# Patient Record
Sex: MALE | Race: WHITE | Employment: OTHER | URBAN - METROPOLITAN AREA
[De-identification: names, ages, dates, MRNs, and addresses within clinical notes are randomized per-mention and may not be internally consistent; named-entity substitution may affect disease eponyms.]

---

## 2021-03-02 ENCOUNTER — TELEPHONE (OUTPATIENT)
Dept: SURGERY | Facility: CLINIC | Age: 66
End: 2021-03-02

## 2021-03-03 ENCOUNTER — OFFICE VISIT (OUTPATIENT)
Dept: SURGERY | Facility: CLINIC | Age: 66
End: 2021-03-03
Payer: COMMERCIAL

## 2021-03-03 VITALS
WEIGHT: 235.19 LBS | OXYGEN SATURATION: 98 % | TEMPERATURE: 98 F | DIASTOLIC BLOOD PRESSURE: 85 MMHG | SYSTOLIC BLOOD PRESSURE: 156 MMHG | RESPIRATION RATE: 16 BRPM | HEART RATE: 76 BPM

## 2021-03-03 DIAGNOSIS — C44.42 SQUAMOUS CELL CARCINOMA OF NECK: Primary | ICD-10-CM

## 2021-03-03 PROBLEM — I25.10 CORONARY ARTERY DISEASE: Status: ACTIVE | Noted: 2021-03-03

## 2021-03-03 PROBLEM — I10 HYPERTENSION: Status: ACTIVE | Noted: 2021-03-03

## 2021-03-03 PROBLEM — E11.9 TYPE II DIABETES MELLITUS (HCC): Status: ACTIVE | Noted: 2021-03-03

## 2021-03-03 PROCEDURE — 3079F DIAST BP 80-89 MM HG: CPT | Performed by: SURGERY

## 2021-03-03 PROCEDURE — 99204 OFFICE O/P NEW MOD 45 MIN: CPT | Performed by: SURGERY

## 2021-03-03 PROCEDURE — 99205 OFFICE O/P NEW HI 60 MIN: CPT | Performed by: SURGERY

## 2021-03-03 PROCEDURE — 3077F SYST BP >= 140 MM HG: CPT | Performed by: SURGERY

## 2021-03-03 NOTE — CONSULTS
New Patient Consultation    Chief Complaint:  Patient presents with:  Consult  SCCa R infraauricular area      History of Present Illness:   Luis Antonio Patterson is a 72year old male referred by Dr. 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  He noted this in t Use      Smoking status: Former Smoker      Smokeless tobacco: Never Used    Substance and Sexual Activity      Alcohol use: Yes        Review of Systems:    A 13 point review of systems was performed on the intake sheet.   The patient reports see HPI  Gene disorders, blood clots, or pulmonary embolism. Musculoskeletal:  The patient denies muscle aches/pain, joint pain, stiff joints, neck pain, back pain or bone pain.   Neurologic:  There is no history of migraines or severe headaches, seizure/epilepsy, sp We discussed skin grafts local flaps regional flaps and free flaps as potential reconstructive options. We discussed if facial nerve involvement he may need nerve grafts etc,     The different treatment options were discussed with the patient.   The procedu

## 2021-03-03 NOTE — CONSULTS
[facility] [facility] Surgical Oncology        Patient Name:  Adin Shay   YOB: 1955   Gender:  Male   Appt Date:  3/3/2021   Provider:  Deena Pichardo MD     PATIENT PROVIDERS  Referring Provider: Rick Matias MD  Primary Care Provider:Home •  mupirocin 2 % External Ointment, Apply to open wound twice daily, Disp: 22 g, Rfl: 2  •  SITagliptin-metFORMIN HCl ER  MG Oral Tablet 24 Hr, Take 2 tablets by mouth daily. , Disp: , Rfl:   •  tadalafil 5 MG Oral Tab, Take 5 mg by mouth daily as ne Skin: The scalp and remainder of the skin do not show any suspicious lesions. The wide excision site is well healed.  There is a 6.5 cm x 2.5 cm ulcerated fungating lesion of the right neck extending somewhat anteriorly into the face and wrapping around the

## 2021-03-08 ENCOUNTER — LAB ENCOUNTER (OUTPATIENT)
Dept: LAB | Facility: HOSPITAL | Age: 66
End: 2021-03-08
Attending: SURGERY
Payer: MEDICARE

## 2021-03-08 DIAGNOSIS — C44.42 SQUAMOUS CELL CARCINOMA OF NECK: Primary | ICD-10-CM

## 2021-03-08 DIAGNOSIS — C44.92 SCC (SQUAMOUS CELL CARCINOMA): Primary | ICD-10-CM

## 2021-03-08 PROCEDURE — 88321 CONSLTJ&REPRT SLD PREP ELSWR: CPT

## 2021-03-14 ENCOUNTER — HOSPITAL ENCOUNTER (OUTPATIENT)
Dept: CT IMAGING | Age: 66
Discharge: HOME | End: 2021-03-14
Attending: PHYSICIAN ASSISTANT
Payer: MEDICARE

## 2021-03-14 DIAGNOSIS — C44.42 SQUAMOUS CELL CARCINOMA OF NECK: ICD-10-CM

## 2021-03-14 LAB — CREAT BLD-MCNC: 1.1 MG/DL

## 2021-03-14 PROCEDURE — 70491 CT SOFT TISSUE NECK W/DYE: CPT | Performed by: PHYSICIAN ASSISTANT

## 2021-03-14 PROCEDURE — 82565 ASSAY OF CREATININE: CPT

## 2021-03-16 ENCOUNTER — TELEPHONE (OUTPATIENT)
Dept: SURGERY | Facility: CLINIC | Age: 66
End: 2021-03-16

## 2021-03-16 DIAGNOSIS — C44.42 SQUAMOUS CELL CARCINOMA OF NECK: Primary | ICD-10-CM

## 2021-03-16 NOTE — TELEPHONE ENCOUNTER
CT scan performed and reviewed. I called patient and discussed the results with him.  -Plan to further discuss at tomorrow's tumor board. -PET/CT scan ordered. Patient to schedule.

## 2021-03-22 ENCOUNTER — HOSPITAL ENCOUNTER (OUTPATIENT)
Dept: NUCLEAR MEDICINE | Facility: HOSPITAL | Age: 66
Discharge: HOME | End: 2021-03-22
Attending: SURGERY
Payer: MEDICARE

## 2021-03-22 DIAGNOSIS — C44.42 SQUAMOUS CELL CARCINOMA OF NECK: ICD-10-CM

## 2021-03-22 LAB — GLUCOSE BLD-MCNC: 138 MG/DL (ref 70–99)

## 2021-03-22 PROCEDURE — 82962 GLUCOSE BLOOD TEST: CPT

## 2021-03-22 PROCEDURE — 78815 PET IMAGE W/CT SKULL-THIGH: CPT | Performed by: SURGERY

## 2021-03-24 ENCOUNTER — OFFICE VISIT (OUTPATIENT)
Dept: SURGERY | Facility: CLINIC | Age: 66
End: 2021-03-24
Payer: COMMERCIAL

## 2021-03-24 VITALS
OXYGEN SATURATION: 98 % | DIASTOLIC BLOOD PRESSURE: 83 MMHG | SYSTOLIC BLOOD PRESSURE: 159 MMHG | WEIGHT: 233.38 LBS | HEART RATE: 74 BPM | TEMPERATURE: 97 F | RESPIRATION RATE: 16 BRPM

## 2021-03-24 DIAGNOSIS — Z01.818 PRE-OP TESTING: ICD-10-CM

## 2021-03-24 DIAGNOSIS — C44.42 SQUAMOUS CELL CARCINOMA OF NECK: Primary | ICD-10-CM

## 2021-03-24 PROCEDURE — 3079F DIAST BP 80-89 MM HG: CPT | Performed by: SURGERY

## 2021-03-24 PROCEDURE — 3077F SYST BP >= 140 MM HG: CPT | Performed by: SURGERY

## 2021-03-24 PROCEDURE — 99213 OFFICE O/P EST LOW 20 MIN: CPT | Performed by: SURGERY

## 2021-03-24 PROCEDURE — 99214 OFFICE O/P EST MOD 30 MIN: CPT | Performed by: SURGERY

## 2021-03-24 NOTE — H&P
[facility] [facility] Surgical Oncology        Patient Name:  Arnulfo Beasley   YOB: 1955   Gender:  Male   Appt Date:  3/24/2021   Provider:  Maine Quiñonez MD     PATIENT PROVIDERS    [facility], MD   Address: [address] [place] [address] 1 Social History:  Social History    Tobacco Use      Smoking status: Former Smoker      Smokeless tobacco: Never Used    Alcohol use: Yes    Drug use: Not on file     Reviewed:  Family History   Problem Relation Age of Onset   • Other (lung cancer) Mother Dr. Yanira Perla from plastic surgery for consideration of reconstruction. I would like also to involve my esteemed otolaryngology colleague, Dr. Treasure Dukes, and surgical care. Patient agreed and understood. He had ample time to ask questions.   Arrangements will

## 2021-03-24 NOTE — PATIENT INSTRUCTIONS
Surgery: Resection neck/face squamous cell carcinoma partial resection ear, right superficial parotidectomy, possible lymph node dissection reconstruction with Dr. Nathan Maldonado    Date of Surgery: [facility]    Hospital:    [address], [facility] need to contact the prescribing provider for specific instructions on holding these medications for your procedure. · Inform your primary care physician of your surgery and ask if him/her will need to see you prior to surgery.   · If you develop symptoms o

## 2021-03-24 NOTE — CONSULTS
Estabilshed Patient Consultation    Chief Complaint: SCCA R neck infraauricular area    History of Present Illness:   Suze Rivera is a 72year old male referred by Dr. 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  He noted this in the spring of last year adherent to the underlying area     Integument/Skin:see above      Respiratory: Normal respiratory effort.      Cardiovascular: no cyanosis, no edema     Lymphatic:  There is no cervical, supraclavicular,  lymphadenopathy appreciated.     Musculoskeletal: counseling the patient and coordinating care.     Patient understands and wishes to proceed with the procedure, questions were answered.        Rea Head MD  3/24/2021  5:11 PM

## 2021-03-24 NOTE — PATIENT INSTRUCTIONS
Surgeon:              Dr. Preeti Solano.  Radha Clark, PhD                                          Tel:         [phone]                                  Fax:        [phone]    Surgery/Procedure:       Right cheek/infraauricular, ear reconstruction with local symptoms, test positive or have been exposed for Covid- 19 prior to surgery. If Covid-19 positive history, patient was not hospitalized; symptoms included sinus symptoms and resolved completely.      Consent obtained for skin cancer reconstruction  Photos

## 2021-03-25 ENCOUNTER — TELEPHONE (OUTPATIENT)
Dept: SURGERY | Facility: CLINIC | Age: 66
End: 2021-03-25

## 2021-03-26 ENCOUNTER — TELEPHONE (OUTPATIENT)
Dept: SURGERY | Facility: CLINIC | Age: 66
End: 2021-03-26

## 2021-03-26 DIAGNOSIS — C44.42 SQUAMOUS CELL CARCINOMA OF NECK: Primary | ICD-10-CM

## 2021-03-26 NOTE — TELEPHONE ENCOUNTER
Patient called and I scheduled his surgery with Dr Kavya Quigley, Dr Christian Santiago and Dr Carlos Polk on 04/27/2021 at [facility]. Nusrat Hogue from Dr Aldo Castañeda office called me to day to confirm 04/27/21. Confirmed date and location with the patient.  He stated he had his A1C done 2 w

## 2021-04-02 RX ORDER — ACETAMINOPHEN 500 MG
1000 TABLET ORAL ONCE
Status: CANCELLED | OUTPATIENT
Start: 2021-04-02 | End: 2021-04-02

## 2021-04-21 ENCOUNTER — TELEPHONE (OUTPATIENT)
Dept: SURGERY | Facility: CLINIC | Age: 66
End: 2021-04-21

## 2021-04-21 NOTE — TELEPHONE ENCOUNTER
Spoke to patient regarding his clearance for his upcoming surgery. Per Dr. Hayder Greenwood staff, only cardiac clearance is only needed as he doesn't see his PCP.  I will call cardiologist to have the rest of the clearance (CMP and A1C sent over)  Pt was appreciati

## 2021-04-23 ENCOUNTER — TELEPHONE (OUTPATIENT)
Dept: SURGERY | Facility: CLINIC | Age: 66
End: 2021-04-23

## 2021-04-23 NOTE — TELEPHONE ENCOUNTER
Called patient to inform him were missing his CMP for his pre-op medical clearance. He is going to the [facility] tomorrow for his COVID test and will get it done then. He was appreciative of the call.

## 2021-04-23 NOTE — TELEPHONE ENCOUNTER
CMP was found in care everywhere, pt notified that he does not need to repeat the lab. He is cleared for surgery.

## 2021-04-24 ENCOUNTER — LAB ENCOUNTER (OUTPATIENT)
Dept: LAB | Age: 66
DRG: 144 | End: 2021-04-24
Attending: SURGERY
Payer: MEDICARE

## 2021-04-24 DIAGNOSIS — C44.42 SQUAMOUS CELL CARCINOMA OF NECK: ICD-10-CM

## 2021-04-26 ENCOUNTER — ANESTHESIA EVENT (OUTPATIENT)
Dept: SURGERY | Facility: HOSPITAL | Age: 66
DRG: 144 | End: 2021-04-26
Payer: MEDICARE

## 2021-04-26 ENCOUNTER — TELEPHONE (OUTPATIENT)
Dept: SURGERY | Facility: CLINIC | Age: 66
End: 2021-04-26

## 2021-04-26 NOTE — TELEPHONE ENCOUNTER
Sent medical clearance for stat scanning, CBC is scanned into media and CMP is under care everywhere.

## 2021-04-27 ENCOUNTER — ANESTHESIA (OUTPATIENT)
Dept: SURGERY | Facility: HOSPITAL | Age: 66
DRG: 144 | End: 2021-04-27
Payer: MEDICARE

## 2021-04-27 ENCOUNTER — HOSPITAL ENCOUNTER (INPATIENT)
Facility: HOSPITAL | Age: 66
LOS: 1 days | Discharge: HOME | DRG: 144 | End: 2021-04-28
Attending: SURGERY | Admitting: SURGERY
Payer: MEDICARE

## 2021-04-27 DIAGNOSIS — C44.42 SQUAMOUS CELL CARCINOMA OF NECK: Primary | ICD-10-CM

## 2021-04-27 PROCEDURE — 99223 1ST HOSP IP/OBS HIGH 75: CPT | Performed by: HOSPITALIST

## 2021-04-27 PROCEDURE — 0HB4XZZ EXCISION OF NECK SKIN, EXTERNAL APPROACH: ICD-10-PCS | Performed by: SURGERY

## 2021-04-27 PROCEDURE — 3078F DIAST BP <80 MM HG: CPT | Performed by: HOSPITALIST

## 2021-04-27 PROCEDURE — 00BM0ZZ EXCISION OF FACIAL NERVE, OPEN APPROACH: ICD-10-PCS | Performed by: SURGERY

## 2021-04-27 PROCEDURE — 76942 ECHO GUIDE FOR BIOPSY: CPT | Performed by: ANESTHESIOLOGY

## 2021-04-27 PROCEDURE — 3074F SYST BP LT 130 MM HG: CPT | Performed by: HOSPITALIST

## 2021-04-27 PROCEDURE — 00UM0KZ SUPPLEMENT FACIAL NERVE WITH NONAUTOLOGOUS TISSUE SUBSTITUTE, OPEN APPROACH: ICD-10-PCS | Performed by: SURGERY

## 2021-04-27 PROCEDURE — 0KB20ZZ EXCISION OF RIGHT NECK MUSCLE, OPEN APPROACH: ICD-10-PCS | Performed by: SURGERY

## 2021-04-27 PROCEDURE — 3008F BODY MASS INDEX DOCD: CPT | Performed by: HOSPITALIST

## 2021-04-27 PROCEDURE — 0HX2XZZ TRANSFER RIGHT EAR SKIN, EXTERNAL APPROACH: ICD-10-PCS | Performed by: SURGERY

## 2021-04-27 PROCEDURE — 0CB80ZZ EXCISION OF RIGHT PAROTID GLAND, OPEN APPROACH: ICD-10-PCS | Performed by: OTOLARYNGOLOGY

## 2021-04-27 PROCEDURE — 0HX4XZZ TRANSFER NECK SKIN, EXTERNAL APPROACH: ICD-10-PCS | Performed by: SURGERY

## 2021-04-27 PROCEDURE — 07B10ZX EXCISION OF RIGHT NECK LYMPHATIC, OPEN APPROACH, DIAGNOSTIC: ICD-10-PCS | Performed by: SURGERY

## 2021-04-27 PROCEDURE — 0HB2XZZ EXCISION OF RIGHT EAR SKIN, EXTERNAL APPROACH: ICD-10-PCS | Performed by: SURGERY

## 2021-04-27 PROCEDURE — 0JU437Z SUPPLEMENT OF RIGHT NECK SUBCUTANEOUS TISSUE AND FASCIA WITH AUTOLOGOUS TISSUE SUBSTITUTE, PERCUTANEOUS APPROACH: ICD-10-PCS | Performed by: SURGERY

## 2021-04-27 RX ORDER — MEPERIDINE HYDROCHLORIDE 25 MG/ML
12.5 INJECTION INTRAMUSCULAR; INTRAVENOUS; SUBCUTANEOUS AS NEEDED
Status: DISCONTINUED | OUTPATIENT
Start: 2021-04-27 | End: 2021-04-27 | Stop reason: HOSPADM

## 2021-04-27 RX ORDER — HYDROCODONE BITARTRATE AND ACETAMINOPHEN 5; 325 MG/1; MG/1
2 TABLET ORAL AS NEEDED
Status: DISCONTINUED | OUTPATIENT
Start: 2021-04-27 | End: 2021-04-27 | Stop reason: HOSPADM

## 2021-04-27 RX ORDER — DEXTROSE MONOHYDRATE 25 G/50ML
50 INJECTION, SOLUTION INTRAVENOUS
Status: DISCONTINUED | OUTPATIENT
Start: 2021-04-27 | End: 2021-04-28

## 2021-04-27 RX ORDER — SODIUM CHLORIDE, SODIUM LACTATE, POTASSIUM CHLORIDE, CALCIUM CHLORIDE 600; 310; 30; 20 MG/100ML; MG/100ML; MG/100ML; MG/100ML
INJECTION, SOLUTION INTRAVENOUS CONTINUOUS
Status: DISCONTINUED | OUTPATIENT
Start: 2021-04-27 | End: 2021-04-27 | Stop reason: HOSPADM

## 2021-04-27 RX ORDER — HYDROCODONE BITARTRATE AND ACETAMINOPHEN 5; 325 MG/1; MG/1
2 TABLET ORAL EVERY 4 HOURS PRN
Status: DISCONTINUED | OUTPATIENT
Start: 2021-04-27 | End: 2021-04-28

## 2021-04-27 RX ORDER — DEXAMETHASONE SODIUM PHOSPHATE 4 MG/ML
VIAL (ML) INJECTION AS NEEDED
Status: DISCONTINUED | OUTPATIENT
Start: 2021-04-27 | End: 2021-04-27 | Stop reason: SURG

## 2021-04-27 RX ORDER — DOCUSATE SODIUM 100 MG/1
100 CAPSULE, LIQUID FILLED ORAL 2 TIMES DAILY
Qty: 14 CAPSULE | Refills: 0 | Status: SHIPPED | OUTPATIENT
Start: 2021-04-27 | End: 2021-05-13 | Stop reason: ALTCHOICE

## 2021-04-27 RX ORDER — HYDROMORPHONE HYDROCHLORIDE 1 MG/ML
0.4 INJECTION, SOLUTION INTRAMUSCULAR; INTRAVENOUS; SUBCUTANEOUS EVERY 5 MIN PRN
Status: DISCONTINUED | OUTPATIENT
Start: 2021-04-27 | End: 2021-04-27 | Stop reason: HOSPADM

## 2021-04-27 RX ORDER — DIPHENHYDRAMINE HYDROCHLORIDE 50 MG/ML
12.5 INJECTION INTRAMUSCULAR; INTRAVENOUS AS NEEDED
Status: DISCONTINUED | OUTPATIENT
Start: 2021-04-27 | End: 2021-04-27 | Stop reason: HOSPADM

## 2021-04-27 RX ORDER — LIDOCAINE HYDROCHLORIDE 10 MG/ML
INJECTION, SOLUTION EPIDURAL; INFILTRATION; INTRACAUDAL; PERINEURAL AS NEEDED
Status: DISCONTINUED | OUTPATIENT
Start: 2021-04-27 | End: 2021-04-27 | Stop reason: SURG

## 2021-04-27 RX ORDER — HYDROCODONE BITARTRATE AND ACETAMINOPHEN 5; 325 MG/1; MG/1
1 TABLET ORAL AS NEEDED
Status: DISCONTINUED | OUTPATIENT
Start: 2021-04-27 | End: 2021-04-27 | Stop reason: HOSPADM

## 2021-04-27 RX ORDER — NALOXONE HYDROCHLORIDE 0.4 MG/ML
80 INJECTION, SOLUTION INTRAMUSCULAR; INTRAVENOUS; SUBCUTANEOUS AS NEEDED
Status: DISCONTINUED | OUTPATIENT
Start: 2021-04-27 | End: 2021-04-27 | Stop reason: HOSPADM

## 2021-04-27 RX ORDER — HYDROCODONE BITARTRATE AND ACETAMINOPHEN 5; 325 MG/1; MG/1
1 TABLET ORAL EVERY 6 HOURS PRN
Qty: 20 TABLET | Refills: 0 | Status: SHIPPED | OUTPATIENT
Start: 2021-04-27 | End: 2021-05-13 | Stop reason: ALTCHOICE

## 2021-04-27 RX ORDER — SUFENTANIL CITRATE 50 UG/ML
INJECTION EPIDURAL; INTRAVENOUS AS NEEDED
Status: DISCONTINUED | OUTPATIENT
Start: 2021-04-27 | End: 2021-04-27 | Stop reason: SURG

## 2021-04-27 RX ORDER — LABETALOL HYDROCHLORIDE 5 MG/ML
5 INJECTION, SOLUTION INTRAVENOUS EVERY 5 MIN PRN
Status: DISCONTINUED | OUTPATIENT
Start: 2021-04-27 | End: 2021-04-27 | Stop reason: HOSPADM

## 2021-04-27 RX ORDER — DEXTROSE MONOHYDRATE 25 G/50ML
50 INJECTION, SOLUTION INTRAVENOUS
Status: DISCONTINUED | OUTPATIENT
Start: 2021-04-27 | End: 2021-04-27 | Stop reason: HOSPADM

## 2021-04-27 RX ORDER — HEPARIN SODIUM 5000 [USP'U]/ML
5000 INJECTION, SOLUTION INTRAVENOUS; SUBCUTANEOUS ONCE
Status: COMPLETED | OUTPATIENT
Start: 2021-04-27 | End: 2021-04-27

## 2021-04-27 RX ORDER — ONDANSETRON 2 MG/ML
4 INJECTION INTRAMUSCULAR; INTRAVENOUS AS NEEDED
Status: DISCONTINUED | OUTPATIENT
Start: 2021-04-27 | End: 2021-04-27 | Stop reason: HOSPADM

## 2021-04-27 RX ORDER — ONDANSETRON 2 MG/ML
4 INJECTION INTRAMUSCULAR; INTRAVENOUS EVERY 6 HOURS PRN
Status: DISCONTINUED | OUTPATIENT
Start: 2021-04-27 | End: 2021-04-28

## 2021-04-27 RX ORDER — ENOXAPARIN SODIUM 100 MG/ML
40 INJECTION SUBCUTANEOUS DAILY
Status: DISCONTINUED | OUTPATIENT
Start: 2021-04-28 | End: 2021-04-28

## 2021-04-27 RX ORDER — ONDANSETRON 2 MG/ML
INJECTION INTRAMUSCULAR; INTRAVENOUS AS NEEDED
Status: DISCONTINUED | OUTPATIENT
Start: 2021-04-27 | End: 2021-04-27 | Stop reason: SURG

## 2021-04-27 RX ORDER — DOCUSATE SODIUM 100 MG/1
100 CAPSULE, LIQUID FILLED ORAL 2 TIMES DAILY
Status: DISCONTINUED | OUTPATIENT
Start: 2021-04-27 | End: 2021-04-28

## 2021-04-27 RX ORDER — METOPROLOL SUCCINATE 25 MG/1
25 TABLET, EXTENDED RELEASE ORAL
Status: DISCONTINUED | OUTPATIENT
Start: 2021-04-27 | End: 2021-04-28

## 2021-04-27 RX ORDER — LIDOCAINE HYDROCHLORIDE AND EPINEPHRINE 10; 10 MG/ML; UG/ML
INJECTION, SOLUTION INFILTRATION; PERINEURAL AS NEEDED
Status: DISCONTINUED | OUTPATIENT
Start: 2021-04-27 | End: 2021-04-27 | Stop reason: HOSPADM

## 2021-04-27 RX ORDER — SCOLOPAMINE TRANSDERMAL SYSTEM 1 MG/1
PATCH, EXTENDED RELEASE TRANSDERMAL
Status: DISCONTINUED
Start: 2021-04-27 | End: 2021-04-28

## 2021-04-27 RX ORDER — SODIUM CHLORIDE, SODIUM LACTATE, POTASSIUM CHLORIDE, CALCIUM CHLORIDE 600; 310; 30; 20 MG/100ML; MG/100ML; MG/100ML; MG/100ML
INJECTION, SOLUTION INTRAVENOUS CONTINUOUS
Status: DISCONTINUED | OUTPATIENT
Start: 2021-04-27 | End: 2021-04-28

## 2021-04-27 RX ORDER — SCOLOPAMINE TRANSDERMAL SYSTEM 1 MG/1
1 PATCH, EXTENDED RELEASE TRANSDERMAL
Status: DISCONTINUED | OUTPATIENT
Start: 2021-04-27 | End: 2021-04-27 | Stop reason: HOSPADM

## 2021-04-27 RX ORDER — HYDROCODONE BITARTRATE AND ACETAMINOPHEN 5; 325 MG/1; MG/1
1 TABLET ORAL EVERY 4 HOURS PRN
Status: DISCONTINUED | OUTPATIENT
Start: 2021-04-27 | End: 2021-04-28

## 2021-04-27 RX ORDER — ROSUVASTATIN CALCIUM 10 MG/1
10 TABLET, COATED ORAL DAILY
Status: DISCONTINUED | OUTPATIENT
Start: 2021-04-27 | End: 2021-04-28

## 2021-04-27 RX ORDER — PHENYLEPHRINE HCL 10 MG/ML
VIAL (ML) INJECTION AS NEEDED
Status: DISCONTINUED | OUTPATIENT
Start: 2021-04-27 | End: 2021-04-27 | Stop reason: SURG

## 2021-04-27 RX ORDER — CEFAZOLIN SODIUM/WATER 2 G/20 ML
2 SYRINGE (ML) INTRAVENOUS ONCE
Status: COMPLETED | OUTPATIENT
Start: 2021-04-27 | End: 2021-04-27

## 2021-04-27 RX ORDER — MIDAZOLAM HYDROCHLORIDE 1 MG/ML
1 INJECTION INTRAMUSCULAR; INTRAVENOUS EVERY 5 MIN PRN
Status: DISCONTINUED | OUTPATIENT
Start: 2021-04-27 | End: 2021-04-27 | Stop reason: HOSPADM

## 2021-04-27 RX ADMIN — PHENYLEPHRINE HCL 100 MCG: 10 MG/ML VIAL (ML) INJECTION at 09:01:00

## 2021-04-27 RX ADMIN — SODIUM CHLORIDE, SODIUM LACTATE, POTASSIUM CHLORIDE, CALCIUM CHLORIDE: 600; 310; 30; 20 INJECTION, SOLUTION INTRAVENOUS at 07:34:00

## 2021-04-27 RX ADMIN — PHENYLEPHRINE HCL 100 MCG: 10 MG/ML VIAL (ML) INJECTION at 10:20:00

## 2021-04-27 RX ADMIN — SUFENTANIL CITRATE 100 MCG: 50 INJECTION EPIDURAL; INTRAVENOUS at 08:50:00

## 2021-04-27 RX ADMIN — PHENYLEPHRINE HCL 100 MCG: 10 MG/ML VIAL (ML) INJECTION at 10:50:00

## 2021-04-27 RX ADMIN — SUFENTANIL CITRATE 100 MCG: 50 INJECTION EPIDURAL; INTRAVENOUS at 10:56:00

## 2021-04-27 RX ADMIN — PHENYLEPHRINE HCL 150 MCG: 10 MG/ML VIAL (ML) INJECTION at 09:55:00

## 2021-04-27 RX ADMIN — PHENYLEPHRINE HCL 50 MCG: 10 MG/ML VIAL (ML) INJECTION at 10:09:00

## 2021-04-27 RX ADMIN — SODIUM CHLORIDE, SODIUM LACTATE, POTASSIUM CHLORIDE, CALCIUM CHLORIDE: 600; 310; 30; 20 INJECTION, SOLUTION INTRAVENOUS at 12:55:00

## 2021-04-27 RX ADMIN — PHENYLEPHRINE HCL 100 MCG: 10 MG/ML VIAL (ML) INJECTION at 08:33:00

## 2021-04-27 RX ADMIN — DEXAMETHASONE SODIUM PHOSPHATE 4 MG: 4 MG/ML VIAL (ML) INJECTION at 08:01:00

## 2021-04-27 RX ADMIN — PHENYLEPHRINE HCL 100 MCG: 10 MG/ML VIAL (ML) INJECTION at 11:17:00

## 2021-04-27 RX ADMIN — CEFAZOLIN SODIUM/WATER 2 G: 2 G/20 ML SYRINGE (ML) INTRAVENOUS at 08:18:00

## 2021-04-27 RX ADMIN — PHENYLEPHRINE HCL 100 MCG: 10 MG/ML VIAL (ML) INJECTION at 11:03:00

## 2021-04-27 RX ADMIN — SODIUM CHLORIDE, SODIUM LACTATE, POTASSIUM CHLORIDE, CALCIUM CHLORIDE: 600; 310; 30; 20 INJECTION, SOLUTION INTRAVENOUS at 08:52:00

## 2021-04-27 RX ADMIN — ONDANSETRON 4 MG: 2 INJECTION INTRAMUSCULAR; INTRAVENOUS at 15:23:00

## 2021-04-27 RX ADMIN — PHENYLEPHRINE HCL 100 MCG: 10 MG/ML VIAL (ML) INJECTION at 10:56:00

## 2021-04-27 RX ADMIN — LIDOCAINE HYDROCHLORIDE 50 MG: 10 INJECTION, SOLUTION EPIDURAL; INFILTRATION; INTRACAUDAL; PERINEURAL at 07:41:00

## 2021-04-27 RX ADMIN — CEFAZOLIN SODIUM/WATER 2 G: 2 G/20 ML SYRINGE (ML) INTRAVENOUS at 12:10:00

## 2021-04-27 NOTE — H&P
[facility]          Patient Name:  Tobi Leroy   YOB: 1955   Gender:  Male   Appt Date:  4/27/2021   Provider:  Asim Kraus MD      PATIENT Jimmy Perez MD   Address: [address] [place] [address] Date   • Diabetes mellitus type II, controlled ([facility] [address]     • Dyslipidemia     • Hypertension        Reviewed:        Past Surgical History:   Procedure Laterality Date   • Cardiac catheterization          Reviewed Social History:  Social History    Tobacco U

## 2021-04-27 NOTE — ANESTHESIA PREPROCEDURE EVALUATION
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  Normal stress test 2019)                                Endo/Other      (+) diabetes  type 2, not using insulin  (-) hypothyroidism  (-) hypert

## 2021-04-27 NOTE — ANESTHESIA PROCEDURE NOTES
Airway  Urgency: elective      General Information and Staff    Patient location during procedure: OR  Anesthesiologist: Deena Bal MD  Performed: anesthesiologist     Indications and Patient Condition  Indications for airway management: anesthesia  Se

## 2021-04-27 NOTE — ANESTHESIA POSTPROCEDURE EVALUATION
[address] Patient Status:  Inpatient   Age/Gender 72year old male MRN [id number]   [facility] SURGERY Attending Kayla Dugan MD   [facility] Day # 0 PCP Awais Sofia MD       Anesthesia Post-op Note    Radical re stable    Dental Exam: Unchanged from Preop    Patient to be discharged from PACU when criteria met.

## 2021-04-27 NOTE — OPERATIVE REPORT
[facility]    PATIENT'S NAME: Sabina CALDERON   ATTENDING PHYSICIAN: Vipul Villanueva MD   OPERATING PHYSICIAN: Jamil Lee M.D.    PATIENT ACCOUNT#:   [de-identified]    LOCATION:  [facility] OR [place] ROOMS [address] #:   [id number] The tissue was divided using the Harmonic scalpel. I dissected down to the pes of the facial nerve. It was stimulated and noted to be intact. I then carefully dissected out each branch of the facial nerve. I started inferiorly.   The inferior branches

## 2021-04-27 NOTE — BRIEF OP NOTE
Pre-Operative Diagnosis: Squamous cell carcinoma of neck [C44.42]     Post-Operative Diagnosis: Squamous cell carcinoma of neck [C44.42]     Procedure Performed:   Radical resection right neck/face squamous cell carcinoma with partial resection right ear,

## 2021-04-27 NOTE — ANESTHESIA PROCEDURE NOTES
Arterial Line  Performed by: Iman Khan MD  Authorized by: Iman Khan MD     General Information and Staff    Procedure Start:  4/27/2021 8:00 AM  Procedure End:  4/27/2021 8:09 AM  Anesthesiologist:  Iman Khan MD  Performed By:  Carlos Jordan

## 2021-04-27 NOTE — BRIEF OP NOTE
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

## 2021-04-27 NOTE — PLAN OF CARE
Assumed pt care @ 1800. Pt alert and oriented x4, NSR, on room air. Denies pain. Head/neck dressing clean/dry/intact. Pt voiding without difficulty. JOSÉ with bloody OP. Pt ambulating to BR with 1 assist. Tolerating reg diet.

## 2021-04-28 VITALS
DIASTOLIC BLOOD PRESSURE: 69 MMHG | TEMPERATURE: 98 F | WEIGHT: 231.69 LBS | HEART RATE: 71 BPM | SYSTOLIC BLOOD PRESSURE: 129 MMHG | BODY MASS INDEX: 29.73 KG/M2 | HEIGHT: 74 IN | RESPIRATION RATE: 18 BRPM | OXYGEN SATURATION: 98 %

## 2021-04-28 PROBLEM — E11.9 DIABETES MELLITUS TYPE 2 IN NONOBESE (HCC): Status: ACTIVE | Noted: 2021-03-03

## 2021-04-28 PROBLEM — I10 BENIGN ESSENTIAL HTN: Status: ACTIVE | Noted: 2021-03-03

## 2021-04-28 PROCEDURE — 99232 SBSQ HOSP IP/OBS MODERATE 35: CPT | Performed by: HOSPITALIST

## 2021-04-28 NOTE — PROGRESS NOTES
POD #1 s/p resection, right neck/face squamous cell carcinoma with partial resection, right ear, soft tissue/muscle, selective lymph node dissection (brackets by myself) with en bloc right superficial parotidectomy with nerve monitoring (Dr. Fiona Canela

## 2021-04-28 NOTE — PROGRESS NOTES
Plastic Surgery Progress Note    Merlin Morillo is a 72year old male POD#1 s/p radical resection right neck/face squamous cell carcinoma with partial resection right ear, soft tissue/muscle, selective lymph node dissection (Dr. Jocy Gandhi), right superfi office will call to set up this appointment.     Lisa Collins  4/28/2021  9:52 AM

## 2021-04-28 NOTE — PROGRESS NOTES
[facility]  Progress Note     Yoselin Beltran Patient Status:  Inpatient    1955 MRN [id number]   [facility] 7NE-A Attending Real Girard MD   Hosp Day # 1 PCP Kathy Estrada MD     Chief Complaint: med mgmt    S: May Myers 25 mg Oral Daily Beta Blocker   • Rosuvastatin Calcium  10 mg Oral Daily   • Insulin Aspart Pen  1-10 Units Subcutaneous TID AC and HS       ASSESSMENT / PLAN:     1. Squamous cell carcinoma of the head neck  1.  S/p radical resection w/ right superficial

## 2021-04-28 NOTE — OPERATIVE REPORT
[facility]    PATIENT'S NAME: Gilford Call ALBERT   ATTENDING PHYSICIAN: Zenia Carty MD   OPERATING PHYSICIAN: Rea Estrada MD   PATIENT ACCOUNT#:   [id number]    LOCATION:  [address] [place] [address]  MEDICAL RECORD #:   [id number]       DATE OF BIRTH: graft, possible skin graft, local flaps, or even free flap. Potential risks, complications, benefits, and alternatives were in detail discussed with the patient and his wife.   Risks and complications including, but not limited to, infection, bleeding, sca nerve ends were trimmed until healthy fascicles were seen, and then interrupted aparna/epineural sutures were placed to inset the graft. After the 2 coaptations were done, a small fat graft was placed over the coaptation from the neck subcutaneous tissue. All sponge, instrument, and needle counts were correct at the end of the case. Dictated By Tez Swift.  Sonia Burrows MD  d: 04/27/2021 18:09:02  t: 04/28/2021 04:18:10  [facility] [id number]  [facility]/

## 2021-04-28 NOTE — OPERATIVE REPORT
[address]    PATIENT'S NAME: [facility]   ATTENDING PHYSICIAN: Brad Barragan MD   OPERATING PHYSICIAN: Remigio Hinds.  Abbey Barragan MD   PATIENT ACCOUNT#:   [id number]    LOCATION:  [address] [place] [address]  MEDICAL RECORD #:   [id number]       DATE OF SERGIO ear, and neck. The width of excision was about 7 cm. More anteriorly, the dissection was carried out to the level of the superficial parotid fascia.   Posterior, this was carried out to the level of the sternocleidomastoid muscle going through the platysm

## 2021-04-28 NOTE — PLAN OF CARE
Pt cleared by all services. Pt alert and oriented x4, Nsr, on room air denies pain. Dressing C/D/I. Pt cleared for dc by all services. NURSING DISCHARGE NOTE    Discharged Home via Ambulatory. Accompanied by RN  Belongings Taken by patient/family.

## 2021-04-28 NOTE — PLAN OF CARE
Assumed patient care at [facility] 23 \"mild\" pain on R side of face when chewing; declining pharmacological intervention. Head/neck dressing C/D/I  R JOSÉ with bloody output  Voiding adequately    Plan of care discussed with patient and physician.

## 2021-05-05 ENCOUNTER — OFFICE VISIT (OUTPATIENT)
Dept: SURGERY | Facility: CLINIC | Age: 66
End: 2021-05-05
Payer: COMMERCIAL

## 2021-05-05 VITALS
DIASTOLIC BLOOD PRESSURE: 85 MMHG | OXYGEN SATURATION: 96 % | WEIGHT: 229.81 LBS | TEMPERATURE: 99 F | BODY MASS INDEX: 30 KG/M2 | HEART RATE: 80 BPM | RESPIRATION RATE: 16 BRPM | SYSTOLIC BLOOD PRESSURE: 136 MMHG

## 2021-05-05 DIAGNOSIS — C44.42 SQUAMOUS CELL CARCINOMA OF NECK: Primary | ICD-10-CM

## 2021-05-05 DIAGNOSIS — C77.0 SECONDARY MALIGNANCY OF CERVICOFACIAL LYMPH NODE (HCC): ICD-10-CM

## 2021-05-05 DIAGNOSIS — C79.89: ICD-10-CM

## 2021-05-05 PROCEDURE — 1111F DSCHRG MED/CURRENT MED MERGE: CPT | Performed by: SURGERY

## 2021-05-05 PROCEDURE — 99024 POSTOP FOLLOW-UP VISIT: CPT | Performed by: SURGERY

## 2021-05-05 PROCEDURE — 3075F SYST BP GE 130 - 139MM HG: CPT | Performed by: SURGERY

## 2021-05-05 PROCEDURE — 3079F DIAST BP 80-89 MM HG: CPT | Performed by: SURGERY

## 2021-05-05 NOTE — PROGRESS NOTES
[facility]        Patient Name:  Fabio Beltran   YOB: 1955   Gender:  Male   Appt Date:  5/5/2021   Provider:  Fabienne Samuel MD     PATIENT PROVIDERS  Referring Provider: Anshu Walton MD  Primary Care Provid Tab, Take 10 mg by mouth daily. , Disp: , Rfl:   •  Metoprolol Succinate ER 25 MG Oral Tablet 24 Hr, Take 25 mg by mouth daily. , Disp: , Rfl:   •  Lisinopril-hydroCHLOROthiazide 10-12.5 MG Oral Tab, Take 1 tablet by mouth daily. , Disp: , Rfl:   •  aspirin 8 Review:  Final Diagnosis:   A.  Skin, right neck, 1:00-2:00 margin, excision:  -Margin negative for tumor.     B. Skin, right neck, 2:00-5:00 margin, excision:  -Margin negative for tumor.     C.   Skin, right neck, 5:00-6:00 margin, excision:  -Margin neg

## 2021-05-05 NOTE — PROGRESS NOTES
Felipa Villavicencio is a 72year old male who presents today for a follow-up s/p radical resection right neck/face squamous cell carcinoma with partial resection right ear, soft tissue/muscle, selective lymph node dissection (Dr. Abbey Barragan), right superficial answered. Patient understands.

## 2021-05-06 DIAGNOSIS — C79.9 SQUAMOUS CELL CARCINOMA, METASTATIC (HCC): Primary | ICD-10-CM

## 2021-05-13 ENCOUNTER — HOSPITAL ENCOUNTER (OUTPATIENT)
Dept: RADIATION ONCOLOGY | Facility: HOSPITAL | Age: 66
Discharge: HOME | End: 2021-05-13
Attending: RADIOLOGY
Payer: MEDICARE

## 2021-05-13 VITALS
RESPIRATION RATE: 18 BRPM | TEMPERATURE: 98 F | BODY MASS INDEX: 29.31 KG/M2 | HEART RATE: 74 BPM | DIASTOLIC BLOOD PRESSURE: 77 MMHG | WEIGHT: 228.38 LBS | OXYGEN SATURATION: 97 % | SYSTOLIC BLOOD PRESSURE: 134 MMHG | HEIGHT: 74 IN

## 2021-05-13 DIAGNOSIS — C44.42 SQUAMOUS CELL CARCINOMA OF SKIN OF NECK: Primary | ICD-10-CM

## 2021-05-13 PROBLEM — E78.5 HYPERLIPIDEMIA: Status: ACTIVE | Noted: 2021-05-13

## 2021-05-13 PROBLEM — R94.39 ABNORMAL CARDIOVASCULAR STRESS TEST: Status: ACTIVE | Noted: 2021-05-13

## 2021-05-13 PROCEDURE — 99214 OFFICE O/P EST MOD 30 MIN: CPT

## 2021-05-13 NOTE — PATIENT INSTRUCTIONS
Please let us know when you receive clearance from your surgeon to begin radiation therapy.      [phone]

## 2021-05-13 NOTE — PROGRESS NOTES
Nursing Consultation Note  Patient: Cha Obando  YOB: 1955  Age: 72year old  Radiation Oncologist: Dr. Dee Sparrow  Referring Physician: [email or web address]  Consult Date: 5/13/2021      Chemotherapy: NA  Lab about scheduling of RT. Skin to right neck and ear is healing, so s/s of erythema, heat, drainage. Denies pain and not currently using any pain medication. No issues with swallowing reported. Weight stable. Type II diabetic, exercises regularly. dissection(FANY),        Social History    Socioeconomic History      Marital status:       Spouse name: Not on file      Number of children: Not on file      Years of education: Not on file      Highest education level: Not on file    Occupational knowledge needs  Instruct on treatment planning  Instruct on radiation therapy appointment scheduling  Instruct on basic skin care  Instruct on side effects of radiation therapy    Expected Outcomes:  Knowledge of care plan  Knowledge of radiation therapy

## 2021-05-13 NOTE — CONSULTS
[facility]     PATIENT:   Roxana Hand MD:  Jeremy Stahl MD      DIAGNOSIS:   T3 N2b M0 SCCA skin, R neck/earlobe        CC:    Locally advanced skin cancer    HPI   73 yo man presented with la ([facility]    • High blood pressure    • High cholesterol    • History of COVID-19     COVID + 10- had nasal congestion, headache, loss of tast & smell - NO hospitalization needed     • CARDIAC CATHETERIZATION     • OTHER  04/27/2021    Radical resection -recommend radiation therapy to postop bed and other adjacent at-risk areas.   -med onc did not advise concurrent chemo at tumor board    -Hard to know for sure which nerve is involved per the path report, but suspect CN 5, 7, and branches of nerves from

## 2021-06-02 ENCOUNTER — OFFICE VISIT (OUTPATIENT)
Dept: SURGERY | Facility: CLINIC | Age: 66
End: 2021-06-02
Payer: COMMERCIAL

## 2021-06-02 DIAGNOSIS — C44.42 SQUAMOUS CELL CARCINOMA OF NECK: Primary | ICD-10-CM

## 2021-06-02 PROCEDURE — 99024 POSTOP FOLLOW-UP VISIT: CPT | Performed by: SURGERY

## 2021-06-02 NOTE — PROGRESS NOTES
Joanna Rey is a 72year old male who presents today for a follow-up s/p radical resection right neck/face squamous cell carcinoma with partial resection right ear, soft tissue/muscle, selective lymph node dissection (Dr. Cooper), right superficial

## 2021-06-07 ENCOUNTER — HOSPITAL ENCOUNTER (OUTPATIENT)
Dept: RADIATION ONCOLOGY | Facility: HOSPITAL | Age: 66
Discharge: HOME | End: 2021-06-07
Attending: RADIOLOGY
Payer: MEDICARE

## 2021-06-07 PROCEDURE — 77334 RADIATION TREATMENT AID(S): CPT | Performed by: RADIOLOGY

## 2021-06-08 PROCEDURE — 77399 UNLISTED PX MED RADJ PHYSICS: CPT | Performed by: RADIOLOGY

## 2021-06-15 PROCEDURE — 77301 RADIOTHERAPY DOSE PLAN IMRT: CPT | Performed by: RADIOLOGY

## 2021-06-15 PROCEDURE — 77338 DESIGN MLC DEVICE FOR IMRT: CPT | Performed by: RADIOLOGY

## 2021-06-15 PROCEDURE — 77300 RADIATION THERAPY DOSE PLAN: CPT | Performed by: RADIOLOGY

## 2021-06-28 ENCOUNTER — HOSPITAL ENCOUNTER (OUTPATIENT)
Dept: RADIATION ONCOLOGY | Facility: HOSPITAL | Age: 66
Discharge: HOME | End: 2021-06-28
Attending: RADIOLOGY
Payer: MEDICARE

## 2021-06-28 VITALS
SYSTOLIC BLOOD PRESSURE: 126 MMHG | DIASTOLIC BLOOD PRESSURE: 70 MMHG | RESPIRATION RATE: 19 BRPM | HEART RATE: 67 BPM | OXYGEN SATURATION: 98 %

## 2021-06-28 DIAGNOSIS — C44.42 SQUAMOUS CELL CARCINOMA OF SKIN OF NECK: Primary | ICD-10-CM

## 2021-06-28 PROCEDURE — 77332 RADIATION TREATMENT AID(S): CPT | Performed by: RADIOLOGY

## 2021-06-28 PROCEDURE — 77386 HC IMRT COMPLEX: CPT | Performed by: RADIOLOGY

## 2021-06-28 NOTE — PROGRESS NOTES
[facility] Radiation Treatment Management Note 1-5    Patient:  Joan Villarreal  Age:  72year old  Visit Diagnosis:  SCCA skin, R parotid area  Primary Rad/Onc:  Dr. Gema Pierson    Site Delivered Dose (cGy) Prescribed Dose (cGy) F

## 2021-06-29 PROCEDURE — 77386 HC IMRT COMPLEX: CPT | Performed by: RADIOLOGY

## 2021-06-30 PROCEDURE — 77386 HC IMRT COMPLEX: CPT | Performed by: RADIOLOGY

## 2021-07-01 ENCOUNTER — HOSPITAL ENCOUNTER (OUTPATIENT)
Dept: RADIATION ONCOLOGY | Facility: HOSPITAL | Age: 66
Discharge: HOME | End: 2021-07-01
Attending: RADIOLOGY
Payer: MEDICARE

## 2021-07-01 PROCEDURE — 77386 HC IMRT COMPLEX: CPT | Performed by: RADIOLOGY

## 2021-07-02 PROCEDURE — 77336 RADIATION PHYSICS CONSULT: CPT | Performed by: RADIOLOGY

## 2021-07-02 PROCEDURE — 77386 HC IMRT COMPLEX: CPT | Performed by: RADIOLOGY

## 2021-07-06 ENCOUNTER — HOSPITAL ENCOUNTER (OUTPATIENT)
Dept: RADIATION ONCOLOGY | Facility: HOSPITAL | Age: 66
Discharge: HOME | End: 2021-07-06
Attending: RADIOLOGY
Payer: MEDICARE

## 2021-07-06 VITALS
DIASTOLIC BLOOD PRESSURE: 89 MMHG | HEART RATE: 67 BPM | OXYGEN SATURATION: 98 % | SYSTOLIC BLOOD PRESSURE: 145 MMHG | TEMPERATURE: 98 F | BODY MASS INDEX: 30 KG/M2 | RESPIRATION RATE: 16 BRPM | WEIGHT: 232.19 LBS

## 2021-07-06 PROCEDURE — 77386 HC IMRT COMPLEX: CPT | Performed by: RADIOLOGY

## 2021-07-06 NOTE — PROGRESS NOTES
[facility] Radiation Treatment Management Note 6-10    Patient:  Charles Hearn  Age:  72year old  Visit Diagnosis:  SCCA skin, R parotid area  Primary Rad/Onc:  Dr. Isrrael Devries    Site Delivered Dose (cGy) Prescribed Dose (cGy)

## 2021-07-07 ENCOUNTER — HOSPITAL ENCOUNTER (OUTPATIENT)
Dept: RADIATION ONCOLOGY | Facility: HOSPITAL | Age: 66
Discharge: HOME | End: 2021-07-07
Attending: RADIOLOGY
Payer: MEDICARE

## 2021-07-07 DIAGNOSIS — C44.42 SQUAMOUS CELL CARCINOMA OF SKIN OF NECK: Primary | ICD-10-CM

## 2021-07-07 PROCEDURE — 77386 HC IMRT COMPLEX: CPT | Performed by: RADIOLOGY

## 2021-07-07 NOTE — PROGRESS NOTES
[facility] Radiation Treatment Management Note 6-10    Patient:  Demi Laird  Age:  72year old  Visit Diagnosis:  SCCA skin, R parotid area  Primary Rad/Onc:  Dr. Adan Nieto    Site Delivered Dose (cGy) Prescribed Dose (cGy)

## 2021-07-08 PROCEDURE — 77386 HC IMRT COMPLEX: CPT | Performed by: RADIOLOGY

## 2021-07-09 PROCEDURE — 77386 HC IMRT COMPLEX: CPT | Performed by: RADIOLOGY

## 2021-07-09 PROCEDURE — 77336 RADIATION PHYSICS CONSULT: CPT | Performed by: RADIOLOGY

## 2021-07-12 ENCOUNTER — HOSPITAL ENCOUNTER (OUTPATIENT)
Dept: RADIATION ONCOLOGY | Facility: HOSPITAL | Age: 66
Discharge: HOME | End: 2021-07-12
Attending: RADIOLOGY
Payer: MEDICARE

## 2021-07-12 VITALS
OXYGEN SATURATION: 94 % | DIASTOLIC BLOOD PRESSURE: 82 MMHG | RESPIRATION RATE: 18 BRPM | TEMPERATURE: 97 F | BODY MASS INDEX: 29 KG/M2 | SYSTOLIC BLOOD PRESSURE: 136 MMHG | HEART RATE: 66 BPM | WEIGHT: 227.63 LBS

## 2021-07-12 DIAGNOSIS — C44.42 SQUAMOUS CELL CARCINOMA OF SKIN OF NECK: Primary | ICD-10-CM

## 2021-07-12 PROCEDURE — 77386 HC IMRT COMPLEX: CPT | Performed by: RADIOLOGY

## 2021-07-12 NOTE — PROGRESS NOTES
[facility] Radiation Treatment Management Note 6-10    Patient:  Sravani Black  Age:  72year old  Visit Diagnosis:  SCCA skin, R parotid area  Primary Rad/Onc:  Dr. Rigo Simon    Site Delivered Dose (cGy) Prescribed Dose (cGy)

## 2021-07-13 PROCEDURE — 77386 HC IMRT COMPLEX: CPT | Performed by: RADIOLOGY

## 2021-07-14 PROCEDURE — 77386 HC IMRT COMPLEX: CPT | Performed by: RADIOLOGY

## 2021-07-15 PROCEDURE — 77386 HC IMRT COMPLEX: CPT | Performed by: RADIOLOGY

## 2021-07-16 PROCEDURE — 77336 RADIATION PHYSICS CONSULT: CPT | Performed by: RADIOLOGY

## 2021-07-16 PROCEDURE — 77386 HC IMRT COMPLEX: CPT | Performed by: RADIOLOGY

## 2021-07-19 ENCOUNTER — HOSPITAL ENCOUNTER (OUTPATIENT)
Dept: RADIATION ONCOLOGY | Facility: HOSPITAL | Age: 66
Discharge: HOME | End: 2021-07-19
Attending: RADIOLOGY
Payer: MEDICARE

## 2021-07-19 VITALS
HEART RATE: 66 BPM | BODY MASS INDEX: 29 KG/M2 | OXYGEN SATURATION: 97 % | TEMPERATURE: 98 F | RESPIRATION RATE: 20 BRPM | DIASTOLIC BLOOD PRESSURE: 77 MMHG | SYSTOLIC BLOOD PRESSURE: 116 MMHG | WEIGHT: 222.19 LBS

## 2021-07-19 DIAGNOSIS — C44.42 SQUAMOUS CELL CARCINOMA OF SKIN OF NECK: Primary | ICD-10-CM

## 2021-07-19 PROCEDURE — 77386 HC IMRT COMPLEX: CPT | Performed by: RADIOLOGY

## 2021-07-19 RX ORDER — SITAGLIPTIN AND METFORMIN HYDROCHLORIDE 50; 1000 MG/1; MG/1
1 TABLET, FILM COATED, EXTENDED RELEASE ORAL DAILY
COMMUNITY
Start: 2021-06-07

## 2021-07-19 RX ORDER — MOMETASONE FUROATE 1 MG/G
CREAM TOPICAL
Qty: 15 G | Refills: 1 | Status: SHIPPED | OUTPATIENT
Start: 2021-07-19

## 2021-07-19 NOTE — PROGRESS NOTES
[facility] Radiation Treatment Management Note 11-15    Patient:  Ziggy Foss  Age:  72year old  Visit Diagnosis:  SCCA skin, R parotid area  Primary Rad/Onc:  Dr. Marvin Cervantes    Site Delivered Dose (cGy) Prescribed Dose (cGy)

## 2021-07-20 PROCEDURE — 77386 HC IMRT COMPLEX: CPT | Performed by: RADIOLOGY

## 2021-07-21 PROCEDURE — 77386 HC IMRT COMPLEX: CPT | Performed by: RADIOLOGY

## 2021-07-22 PROCEDURE — 77386 HC IMRT COMPLEX: CPT | Performed by: RADIOLOGY

## 2021-07-23 PROCEDURE — 77386 HC IMRT COMPLEX: CPT | Performed by: RADIOLOGY

## 2021-07-23 PROCEDURE — 77336 RADIATION PHYSICS CONSULT: CPT | Performed by: RADIOLOGY

## 2021-07-26 ENCOUNTER — HOSPITAL ENCOUNTER (OUTPATIENT)
Dept: RADIATION ONCOLOGY | Facility: HOSPITAL | Age: 66
Discharge: HOME | End: 2021-07-26
Attending: RADIOLOGY
Payer: MEDICARE

## 2021-07-26 VITALS
WEIGHT: 216.19 LBS | RESPIRATION RATE: 18 BRPM | TEMPERATURE: 98 F | HEART RATE: 75 BPM | BODY MASS INDEX: 28 KG/M2 | DIASTOLIC BLOOD PRESSURE: 76 MMHG | OXYGEN SATURATION: 97 % | SYSTOLIC BLOOD PRESSURE: 124 MMHG

## 2021-07-26 DIAGNOSIS — C44.42 SQUAMOUS CELL CARCINOMA OF SKIN OF NECK: Primary | ICD-10-CM

## 2021-07-26 PROCEDURE — 77386 HC IMRT COMPLEX: CPT | Performed by: RADIOLOGY

## 2021-07-26 RX ORDER — ONDANSETRON HYDROCHLORIDE 8 MG/1
8 TABLET, FILM COATED ORAL EVERY 8 HOURS PRN
Qty: 30 TABLET | Refills: 3 | Status: SHIPPED | OUTPATIENT
Start: 2021-07-26

## 2021-07-26 NOTE — PROGRESS NOTES
[facility] Radiation Treatment Management Note 16-20    Patient:  Cha Obando  Age:  72year old  Visit Diagnosis:  SCCA skin, R parotid area  Primary Rad/Onc:  Dr. Dee Sparrow    Site Delivered Dose (cGy) Prescribed Dose (cGy)

## 2021-07-27 PROCEDURE — 77386 HC IMRT COMPLEX: CPT | Performed by: RADIOLOGY

## 2021-07-28 PROCEDURE — 77386 HC IMRT COMPLEX: CPT | Performed by: RADIOLOGY

## 2021-07-29 PROCEDURE — 77386 HC IMRT COMPLEX: CPT | Performed by: RADIOLOGY

## 2021-07-30 PROCEDURE — 77386 HC IMRT COMPLEX: CPT | Performed by: RADIOLOGY

## 2021-07-30 PROCEDURE — 77336 RADIATION PHYSICS CONSULT: CPT | Performed by: RADIOLOGY

## 2021-07-31 RX ORDER — ONDANSETRON 4 MG/1
4 TABLET, ORALLY DISINTEGRATING ORAL EVERY 8 HOURS PRN
Qty: 30 TABLET | Refills: 0 | Status: SHIPPED | OUTPATIENT
Start: 2021-07-31

## 2021-07-31 RX ORDER — PANTOPRAZOLE SODIUM 40 MG/1
40 TABLET, DELAYED RELEASE ORAL DAILY
Qty: 30 TABLET | Refills: 1 | Status: SHIPPED | OUTPATIENT
Start: 2021-07-31

## 2021-08-01 ENCOUNTER — HOSPITAL ENCOUNTER (OUTPATIENT)
Dept: RADIATION ONCOLOGY | Facility: HOSPITAL | Age: 66
Discharge: HOME | End: 2021-08-01
Attending: RADIOLOGY
Payer: MEDICARE

## 2021-08-02 ENCOUNTER — HOSPITAL ENCOUNTER (OUTPATIENT)
Dept: RADIATION ONCOLOGY | Facility: HOSPITAL | Age: 66
End: 2021-08-02
Attending: RADIOLOGY
Payer: MEDICARE

## 2021-08-02 ENCOUNTER — TELEPHONE (OUTPATIENT)
Dept: RADIATION ONCOLOGY | Facility: HOSPITAL | Age: 66
End: 2021-08-02

## 2021-08-02 NOTE — TELEPHONE ENCOUNTER
Admitted to [facility] in [place] with dehydration and acute renal insufficiency    Getting better w/ hydration  No clear evidence of infection or cardiopulm event  Has a catheter due to suspected BPH and retention    Spoke w/ wife    Will have him come in

## 2021-08-02 NOTE — TELEPHONE ENCOUNTER
[employer] calling to let Dr Luis Alberto Muñoz know that Tarun Mas was admitted to [facility] in [place]. [employer] want's to talk to Dr Luis Alberto Muñoz.  Thank you caleb

## 2021-08-03 ENCOUNTER — APPOINTMENT (OUTPATIENT)
Dept: RADIATION ONCOLOGY | Facility: HOSPITAL | Age: 66
End: 2021-08-03
Attending: RADIOLOGY
Payer: MEDICARE

## 2021-08-04 ENCOUNTER — HOSPITAL ENCOUNTER (OUTPATIENT)
Dept: RADIATION ONCOLOGY | Facility: HOSPITAL | Age: 66
Discharge: HOME | End: 2021-08-04
Attending: RADIOLOGY
Payer: MEDICARE

## 2021-08-04 VITALS
SYSTOLIC BLOOD PRESSURE: 121 MMHG | BODY MASS INDEX: 27 KG/M2 | HEART RATE: 65 BPM | DIASTOLIC BLOOD PRESSURE: 74 MMHG | TEMPERATURE: 98 F | RESPIRATION RATE: 20 BRPM | OXYGEN SATURATION: 98 % | WEIGHT: 212.19 LBS

## 2021-08-04 DIAGNOSIS — C44.42 SQUAMOUS CELL CARCINOMA OF SKIN OF NECK: Primary | ICD-10-CM

## 2021-08-04 PROCEDURE — 77386 HC IMRT COMPLEX: CPT | Performed by: RADIOLOGY

## 2021-08-04 RX ORDER — TAMSULOSIN HYDROCHLORIDE 0.4 MG/1
0.4 CAPSULE ORAL DAILY
COMMUNITY
Start: 2021-08-04 | End: 2021-09-03

## 2021-08-04 RX ORDER — SUCRALFATE ORAL 1 G/10ML
1 SUSPENSION ORAL 4 TIMES DAILY
COMMUNITY
Start: 2021-08-03 | End: 2021-09-02

## 2021-08-04 NOTE — PROGRESS NOTES
[facility] Radiation Treatment Management Note 21-25    Patient:  Clovis Underwood  Age:  72year old  Visit Diagnosis:  SCCA skin, R parotid area  Primary Rad/Onc:  Dr. Yoana Mcclain    Site Delivered Dose (cGy) Prescribed Dose (cGy)

## 2021-08-05 PROCEDURE — 77386 HC IMRT COMPLEX: CPT | Performed by: RADIOLOGY

## 2021-08-06 PROCEDURE — 77386 HC IMRT COMPLEX: CPT | Performed by: RADIOLOGY

## 2021-08-09 ENCOUNTER — HOSPITAL ENCOUNTER (OUTPATIENT)
Dept: RADIATION ONCOLOGY | Facility: HOSPITAL | Age: 66
Discharge: HOME | End: 2021-08-09
Attending: RADIOLOGY
Payer: MEDICARE

## 2021-08-09 VITALS
OXYGEN SATURATION: 100 % | DIASTOLIC BLOOD PRESSURE: 72 MMHG | BODY MASS INDEX: 27 KG/M2 | HEART RATE: 73 BPM | TEMPERATURE: 98 F | RESPIRATION RATE: 20 BRPM | WEIGHT: 207.81 LBS | SYSTOLIC BLOOD PRESSURE: 120 MMHG

## 2021-08-09 DIAGNOSIS — C44.42 SQUAMOUS CELL CARCINOMA OF SKIN OF NECK: Primary | ICD-10-CM

## 2021-08-09 PROCEDURE — 77386 HC IMRT COMPLEX: CPT | Performed by: RADIOLOGY

## 2021-08-09 NOTE — PROGRESS NOTES
[facility] Radiation Treatment Management Note 26-30    Patient:  Carlitos Sanchez  Age:  72year old  Visit Diagnosis:  SCCA skin, R parotid area  Primary Rad/Onc:  Dr. Kandice Pittman    Site Delivered Dose (cGy) Prescribed Dose (cGy)

## 2021-08-10 ENCOUNTER — NURSE ONLY (OUTPATIENT)
Dept: RADIATION ONCOLOGY | Facility: HOSPITAL | Age: 66
End: 2021-08-10

## 2021-08-10 VITALS
RESPIRATION RATE: 20 BRPM | DIASTOLIC BLOOD PRESSURE: 85 MMHG | SYSTOLIC BLOOD PRESSURE: 131 MMHG | OXYGEN SATURATION: 98 % | HEART RATE: 75 BPM

## 2021-08-10 PROCEDURE — 77386 HC IMRT COMPLEX: CPT | Performed by: RADIOLOGY

## 2021-08-10 NOTE — PROGRESS NOTES
VS checked, no orthostatic BP. Denies lightheadedness, no falls since yesterday. States dfrinking more fluids, able to tolerate PO intake better. Antonio cath discontinued yesterday by urologist, pt able to urinate without difficulty.  To finish RT tomorrow,

## 2021-08-10 NOTE — PATIENT INSTRUCTIONS
POST-RADIATION INSTRUCTIONS:   - FOLLOW-UP WITH DR. ALEXANDER NEXT MONDAY (8/16)  - SIDE EFFECTS OF RADIATION WILL GRADUALLY SUBSIDE.  IT MAY TAKE 1-2 WEEKS POST-RADIATION FOR YOU TO NOTICE CHANGES; SUCH AS A DECREASE IN YOUR FATIGUE LEVEL, DECREASE IN PAIN AND/

## 2021-08-11 ENCOUNTER — DOCUMENTATION ONLY (OUTPATIENT)
Dept: RADIATION ONCOLOGY | Facility: HOSPITAL | Age: 66
End: 2021-08-11

## 2021-08-11 PROCEDURE — 77336 RADIATION PHYSICS CONSULT: CPT | Performed by: RADIOLOGY

## 2021-08-11 PROCEDURE — 77386 HC IMRT COMPLEX: CPT | Performed by: RADIOLOGY

## 2021-08-17 ENCOUNTER — HOSPITAL ENCOUNTER (OUTPATIENT)
Dept: RADIATION ONCOLOGY | Facility: HOSPITAL | Age: 66
Discharge: HOME | End: 2021-08-17
Attending: RADIOLOGY
Payer: MEDICARE

## 2021-08-17 VITALS
TEMPERATURE: 99 F | SYSTOLIC BLOOD PRESSURE: 134 MMHG | BODY MASS INDEX: 26 KG/M2 | WEIGHT: 204 LBS | RESPIRATION RATE: 16 BRPM | HEART RATE: 77 BPM | DIASTOLIC BLOOD PRESSURE: 85 MMHG | OXYGEN SATURATION: 98 %

## 2021-08-17 DIAGNOSIS — C44.42 SQUAMOUS CELL CARCINOMA OF SKIN OF NECK: Primary | ICD-10-CM

## 2021-08-17 PROCEDURE — 99213 OFFICE O/P EST LOW 20 MIN: CPT

## 2021-08-17 NOTE — PROGRESS NOTES
[facility]  [facility]LLOW UP     PATIENT:   Carlitos Sanchez      9/6/1955    DIAGNOSIS:   Locally advanced SCCA R neck/earlobe, skin      CANCER HISTORY   73 yo man presented with large ulcerated skin lesion/mass just posterior to t

## 2021-08-17 NOTE — PATIENT INSTRUCTIONS
- CALL [phone] FOR A FOLLOW-UP WITH DR. ALEXANDER FOR January 2022    - CALL IF YOU HAVE ANY QUESTIONS/CONCERNS REGARDING RADIATION THERAPY

## 2021-08-17 NOTE — PROGRESS NOTES
Nursing Follow-Up Note    Patient: Joanna Rey  YOB: 1955  Age: 72year old  Radiation Oncologist: Dr. Neftali Rm  Referring Physician: Dr. Isauro Grier  Chief Complaint: Patient presents with:   Follow - Up    Date: 8/17/2021    Toxicitie

## 2021-09-21 DIAGNOSIS — C44.42 SQUAMOUS CELL CARCINOMA OF NECK: Primary | ICD-10-CM

## 2021-09-21 DIAGNOSIS — C77.0 SECONDARY MALIGNANCY OF CERVICOFACIAL LYMPH NODE (HCC): ICD-10-CM

## 2021-09-27 ENCOUNTER — HOSPITAL ENCOUNTER (OUTPATIENT)
Dept: NUCLEAR MEDICINE | Facility: HOSPITAL | Age: 66
Discharge: HOME | End: 2021-09-27
Attending: PHYSICIAN ASSISTANT
Payer: MEDICARE

## 2021-09-27 ENCOUNTER — TELEPHONE (OUTPATIENT)
Dept: SURGERY | Facility: CLINIC | Age: 66
End: 2021-09-27

## 2021-09-27 DIAGNOSIS — C44.42 SQUAMOUS CELL CARCINOMA OF NECK: ICD-10-CM

## 2021-09-27 DIAGNOSIS — C77.0 SECONDARY MALIGNANCY OF CERVICOFACIAL LYMPH NODE (HCC): ICD-10-CM

## 2021-09-27 PROCEDURE — 82962 GLUCOSE BLOOD TEST: CPT

## 2021-09-27 PROCEDURE — 78816 PET IMAGE W/CT FULL BODY: CPT | Performed by: PHYSICIAN ASSISTANT

## 2021-09-27 NOTE — TELEPHONE ENCOUNTER
Spoke to patient regarding PET/CT findings. Patient is already scheduled for urological procedure in regards to left ureteral stone on 10/1.  Patient to schedule follow-up with Dr. Yarely Garsia for physical exam.

## 2021-09-28 LAB — GLUCOSE BLD-MCNC: 87 MG/DL (ref 70–99)

## 2021-10-04 NOTE — PROGRESS NOTES
[facility]  TREATMENT SUMMARY     PATIENT:  Abimbola Cardenas MD: Mecca Villalobos MD  DIAGNOSIS:  Locally advanced SCCA skin, R neck    HISTORY   76 yo with large, ulcerated skin lesion, extending onto R lower earlobe, wi

## 2021-10-13 ENCOUNTER — OFFICE VISIT (OUTPATIENT)
Dept: SURGERY | Facility: CLINIC | Age: 66
End: 2021-10-13
Payer: COMMERCIAL

## 2021-10-13 VITALS
BODY MASS INDEX: 23 KG/M2 | OXYGEN SATURATION: 98 % | DIASTOLIC BLOOD PRESSURE: 78 MMHG | WEIGHT: 181.19 LBS | HEART RATE: 70 BPM | SYSTOLIC BLOOD PRESSURE: 122 MMHG | RESPIRATION RATE: 16 BRPM

## 2021-10-13 DIAGNOSIS — C44.42 SQUAMOUS CELL CARCINOMA OF NECK: Primary | ICD-10-CM

## 2021-10-13 DIAGNOSIS — C77.0 SECONDARY MALIGNANCY OF CERVICOFACIAL LYMPH NODE (HCC): ICD-10-CM

## 2021-10-13 PROCEDURE — 3074F SYST BP LT 130 MM HG: CPT | Performed by: SURGERY

## 2021-10-13 PROCEDURE — 3078F DIAST BP <80 MM HG: CPT | Performed by: SURGERY

## 2021-10-13 PROCEDURE — 99213 OFFICE O/P EST LOW 20 MIN: CPT | Performed by: SURGERY

## 2021-10-13 NOTE — PROGRESS NOTES
[facility]        Patient Name:  Yanely Beltran   YOB: 1955   Gender:  Male   Appt Date:  10/13/2021   Provider:  Deena Pichardo MD     PATIENT PROVIDERS  Referring Provider: Rick Matias MD  Primary Care Prov Disp: 30 tablet, Rfl: 1  •  Ondansetron HCl (ZOFRAN) 8 MG tablet, Take 1 tablet (8 mg total) by mouth every 8 (eight) hours as needed for Nausea., Disp: 30 tablet, Rfl: 3  •  SITagliptin-metFORMIN HCl ER (JANUMET XR)  MG Oral Tablet 24 Hr, Take 2 ta use: Yes      Comment: 1 every month or so     Drug use: Never     Reviewed:  Family History   Problem Relation Age of Onset   • Other (lung cancer) Mother         Review of Systems:  · GENERAL HEALTH: feels well  · HEENT: denies pain; dryness improving  ·

## 2022-01-11 NOTE — PROGRESS NOTES
[facility]        Patient Name:  Lashell Jones   YOB: 1955   Gender:  Male   Appt Date:  1/12/2022   Provider:  Joelle Villanueva MD     PATIENT PROVIDERS  Referring Provider: Cesar Garcia MD  [facility] every 8 (eight) hours as needed for Nausea., Disp: 30 tablet, Rfl: 3  •  SITagliptin-metFORMIN HCl ER (JANUMET XR)  MG Oral Tablet 24 Hr, Take 1 tablet by mouth daily. 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  - PET/CT 3/2022      Follow Up: With the above.        Electronically Signed by:     Trent Shannon MD

## 2022-01-12 ENCOUNTER — HOSPITAL ENCOUNTER (OUTPATIENT)
Dept: RADIATION ONCOLOGY | Facility: HOSPITAL | Age: 67
Discharge: HOME | End: 2022-01-12
Attending: RADIOLOGY
Payer: MEDICARE

## 2022-01-12 ENCOUNTER — OFFICE VISIT (OUTPATIENT)
Dept: SURGERY | Facility: CLINIC | Age: 67
End: 2022-01-12
Payer: COMMERCIAL

## 2022-01-12 VITALS
BODY MASS INDEX: 23 KG/M2 | DIASTOLIC BLOOD PRESSURE: 93 MMHG | SYSTOLIC BLOOD PRESSURE: 147 MMHG | OXYGEN SATURATION: 97 % | WEIGHT: 175.38 LBS | HEART RATE: 67 BPM | RESPIRATION RATE: 20 BRPM | TEMPERATURE: 98 F

## 2022-01-12 DIAGNOSIS — C44.42 SQUAMOUS CELL CARCINOMA OF NECK: Primary | ICD-10-CM

## 2022-01-12 DIAGNOSIS — C44.42 SQUAMOUS CELL CARCINOMA OF SKIN OF NECK: Primary | ICD-10-CM

## 2022-01-12 DIAGNOSIS — C77.0 SECONDARY MALIGNANCY OF CERVICOFACIAL LYMPH NODE (HCC): ICD-10-CM

## 2022-01-12 DIAGNOSIS — C44.42 SQUAMOUS CELL CARCINOMA OF NECK: ICD-10-CM

## 2022-01-12 PROCEDURE — 99213 OFFICE O/P EST LOW 20 MIN: CPT

## 2022-01-12 PROCEDURE — 99213 OFFICE O/P EST LOW 20 MIN: CPT | Performed by: SURGERY

## 2022-01-12 NOTE — PATIENT INSTRUCTIONS
- WE WILL CALL TO SCHEDULE YOUR FOLLOW-UP APPOINTMENT WITH DR. ALEXANDER      - CALL [phone] IF YOU HAVE ANY QUESTIONS/CONCERNS REGARDING RADIATION THERAPY

## 2022-01-12 NOTE — CONSULTS
Estabilshed Patient Consultation     Chief Complaint: s/p facial reconstruction    History of Present Illness:   Ricardo Gonzalez is a 77year old male who returns to the office s/p radical resection right neck/face squamous cell carcinoma with partial unremarkable. Physical Exam:    There were no vitals taken for this visit. Constitutional: The patient is an alert, oriented and well-developed. Neurologic: Speech patterns and movements are normal.     Psychiatric: Affect is appropriate.     Ey month after his PET scan. The different treatment options were discussed with the patient. The procedures and the postoperative care was discussed in detail. Potential risks complications benefits and alternatives were discussed.   Risks and complicati

## 2022-01-12 NOTE — PROGRESS NOTES
Nursing Follow-Up Note    Patient: Rhea Mclaughlin  YOB: 1955  Age: 77year old  Radiation Oncologist: Dr. Jef Bourne  Referring Physician: Dr. Aylin Brock, Dr. Gema Lowry  Chief Complaint: Patient presents with:   Follow - Up    Date: 1/12/2022

## 2022-01-12 NOTE — PATIENT INSTRUCTIONS
Surgeon:              Dr. Faye Mendoza.  Marti Meeks, PhD                                          Tel:         [phone]                                  Fax:        [phone]    Surgery/Procedure:             Right ear reconstruction, otoplasty  1 hour, local a

## 2022-01-14 NOTE — PROGRESS NOTES
[facility] RAD[facility]  FOLLOW UP     PATIENT:   Michelle Milan      9/6/1955    DIAGNOSIS:   Locally advanced SCCA R neck/earlobe, skin      CANCER HISTORY   76 yo man presented with large ulcerated skin lesion/mass just posterior to t

## 2022-01-25 ENCOUNTER — TELEPHONE (OUTPATIENT)
Dept: RADIATION ONCOLOGY | Facility: HOSPITAL | Age: 67
End: 2022-01-25

## 2022-03-22 ENCOUNTER — TELEPHONE (OUTPATIENT)
Dept: SURGERY | Facility: CLINIC | Age: 67
End: 2022-03-22

## 2022-03-22 NOTE — TELEPHONE ENCOUNTER
Called patient to schedule surgery informed me that pet scan won't be done til April and that he has decided to wait til after summer will call us in fall to set up surgery

## 2022-04-13 ENCOUNTER — HOSPITAL ENCOUNTER (OUTPATIENT)
Dept: NUCLEAR MEDICINE | Facility: HOSPITAL | Age: 67
Discharge: HOME | End: 2022-04-13
Attending: SURGERY
Payer: MEDICARE

## 2022-04-13 DIAGNOSIS — C44.42 SQUAMOUS CELL CARCINOMA OF NECK: ICD-10-CM

## 2022-04-13 DIAGNOSIS — C77.0 SECONDARY MALIGNANCY OF CERVICOFACIAL LYMPH NODE (HCC): ICD-10-CM

## 2022-04-13 LAB — GLUCOSE BLD-MCNC: 106 MG/DL (ref 70–99)

## 2022-04-13 PROCEDURE — 82962 GLUCOSE BLOOD TEST: CPT

## 2022-04-13 PROCEDURE — 78816 PET IMAGE W/CT FULL BODY: CPT | Performed by: SURGERY

## 2022-05-11 ENCOUNTER — OFFICE VISIT (OUTPATIENT)
Dept: SURGERY | Facility: CLINIC | Age: 67
End: 2022-05-11
Payer: COMMERCIAL

## 2022-05-11 ENCOUNTER — HOSPITAL ENCOUNTER (OUTPATIENT)
Dept: RADIATION ONCOLOGY | Facility: HOSPITAL | Age: 67
Discharge: HOME | End: 2022-05-11
Attending: RADIOLOGY
Payer: MEDICARE

## 2022-05-11 VITALS
WEIGHT: 173.19 LBS | TEMPERATURE: 98 F | RESPIRATION RATE: 16 BRPM | DIASTOLIC BLOOD PRESSURE: 83 MMHG | SYSTOLIC BLOOD PRESSURE: 143 MMHG | OXYGEN SATURATION: 97 % | HEART RATE: 67 BPM | BODY MASS INDEX: 22 KG/M2

## 2022-05-11 DIAGNOSIS — H61.111: Primary | ICD-10-CM

## 2022-05-11 DIAGNOSIS — E11.9 DIABETES MELLITUS TYPE 2 IN NONOBESE (HCC): ICD-10-CM

## 2022-05-11 DIAGNOSIS — C77.0 SECONDARY MALIGNANCY OF CERVICOFACIAL LYMPH NODE (HCC): Primary | ICD-10-CM

## 2022-05-11 DIAGNOSIS — C44.42 SQUAMOUS CELL CARCINOMA OF SKIN OF NECK: Primary | ICD-10-CM

## 2022-05-11 DIAGNOSIS — C44.42 SQUAMOUS CELL CARCINOMA OF NECK: ICD-10-CM

## 2022-05-11 PROCEDURE — 99213 OFFICE O/P EST LOW 20 MIN: CPT

## 2022-05-11 PROCEDURE — 99213 OFFICE O/P EST LOW 20 MIN: CPT | Performed by: SURGERY

## 2022-05-11 RX ORDER — METFORMIN HYDROCHLORIDE 500 MG/1
500 TABLET, EXTENDED RELEASE ORAL 2 TIMES DAILY
COMMUNITY
Start: 2022-03-08

## 2022-05-11 RX ORDER — METOPROLOL SUCCINATE 25 MG/1
TABLET, EXTENDED RELEASE ORAL
COMMUNITY
Start: 2022-03-17

## 2022-05-11 RX ORDER — ROSUVASTATIN CALCIUM 10 MG/1
TABLET, COATED ORAL
COMMUNITY
Start: 2022-03-17

## 2022-05-11 NOTE — PROGRESS NOTES
[facility] RADIATION ONCOLOGY  FOLLOW UP     PATIENT:   Yuniel Caraballo      9/6/1955    DIAGNOSIS:   Locally advanced SCCA R neck/earlobe, skin      CANCER HISTORY   78 yo man presented with large ulcerated skin lesion/mass just posterior to the R parotid area, extending onto the lower medial earlobe. Shave biopsy Feb 2021 showed well differentiated SCCA. PET scan showed SUV 22 uptake in the mass without adenopathy or metastases. Resection of the mass April 2021 followed by rotation flap closure with nerve graft. Pathology showed 6.5 cm moderately differentiated SCCA. Negative margins. Invading skeletal muscle and parotid gland. Presumably the sternocleidomastoid and the superficial lobe. Positive PNI. Direct invasion of 2 periparotid nodes. 8 additional skin margin specimens negative. History of diabetes, hypertension, high cholesterol, COVID-19 infection. Completed postop radiation, 60 Gy, 30 fx, 8/11/2021, to R side. No chemo. INTERIM HISTORY   Here for follow-up visit with his wife. Right part of tongue is sensitive to salsa, etc  No chewing or swallowing problems  Good saliva  Neck not too stiff  Taste buds a little better    PET 4/13/22 shows no residual/recurrent disease. PHYSICAL EXAM   Weight 173, 175 in Jan 2022    Skin w/o redness and minimal pigmentation. Minimal neck edema. No suspicious masses. No LAD.     IMPRESSION   No evidence of recurrence, 9 months out from postop RT    Taste buds should improve a bit more with time  Seeing surgeons today for correct earlobe pointing anterior    Having a dental implant on the left side  Provided my contact info for DDS can request RT records, but I see no problems w/ left side dental work    F/u 6 mo    Rajwinder Soto MD  Radiation Oncology

## 2022-05-11 NOTE — PATIENT INSTRUCTIONS
- WE WILL CALL TO SCHEDULE YOUR FOLLOW-UP APPOINTMENT WITH DR. ALEXANDER IN 6 MONTHS      - CALL [phone] IF YOU HAVE ANY QUESTIONS/CONCERNS REGARDING RADIATION THERAPY

## 2022-08-24 ENCOUNTER — TELEPHONE (OUTPATIENT)
Dept: SURGERY | Facility: CLINIC | Age: 67
End: 2022-08-24

## 2022-08-24 DIAGNOSIS — H61.111: Primary | ICD-10-CM

## 2022-08-24 NOTE — TELEPHONE ENCOUNTER
Called patient left a voicemail for patient to call us back to schedule surgery with Dr. Keyonna Carrasco

## 2022-08-24 NOTE — TELEPHONE ENCOUNTER
Calling pt in regards to scheduling surgery. Informed pt that I have 11/01/2022 available at [facility] with Dr. Ankit Bains. Pt verbalized understanding and in agreement with date and location. All questions answered. Encouraged pt to call or [employer] message office with any other questions or concerns.

## 2022-09-28 ENCOUNTER — OFFICE VISIT (OUTPATIENT)
Dept: SURGERY | Facility: CLINIC | Age: 67
End: 2022-09-28

## 2022-09-28 VITALS — HEIGHT: 74.02 IN | WEIGHT: 194 LBS | BODY MASS INDEX: 24.9 KG/M2

## 2022-09-28 DIAGNOSIS — H61.111: Primary | ICD-10-CM

## 2022-09-28 PROCEDURE — 99213 OFFICE O/P EST LOW 20 MIN: CPT | Performed by: SURGERY

## 2022-09-28 PROCEDURE — 3008F BODY MASS INDEX DOCD: CPT | Performed by: SURGERY

## 2022-09-28 NOTE — PATIENT INSTRUCTIONS
Surgeon:              Dr. Hiram Miller. Daljit Rosas, PhD                                          Tel:         [phone]                                  Fax:        [phone]    Surgery/Procedure:       Right ear reconstruction, otoplasty, possible autologous fat grafting  2 hours, general anesthesia, outpatient, 11/1/2022                                  Hospital:  [facility]: [address] [place] [address] [address]vd, [place], [address]           [phone]  [facility]: [address], [address]               [phone]    1. Someone will need to drive you to and from the hospital if your procedure is outpatient. 2.Do not drink alcohol or smoke 24 hours prior to your procedure. 3. Bring a picture ID and your insurance card. 4. You will be contacted by the hospital the day before to confirm the procedure time and location. 5. The hospital will also contact you approximately one week before surgery to schedule your COVID test.     6. Do not take any herbal supplements or blood thinners at least one week before your procedure/surgery. This includes NSAID's (aspirin, baby aspirin, Motrin, Ibuprofen, Aleve, Advil, Naproxen, etc), Plavix, fish oil, vitamin E, turmeric, CoQ10, or green tea supplements, etc. *TYLENOL or acetaminophen is ok to take*    7. PRE-OPERATIVE TESTING: History and physical with medical clearance is REQUIRED within 30 days of the surgery date and is mandatory per Dr. Daljit Rosas. *If this is not done, your surgery will be postponed*  MEDICAL CLEARANCE WITH DR. Turner or cardiac clearance with Dr. Kirby Martinez  EKG  HgbA1C  Patient getting labs at [facility] [address]    8. Please inform us if you develop any Covid-19 like symptoms, test positive or have been exposed for Covid- 19 prior to surgery.      Consent obtained for otoplasty and fat grafting  Photos taken on 9/28/2022

## 2022-10-27 ENCOUNTER — TELEPHONE (OUTPATIENT)
Dept: SURGERY | Facility: CLINIC | Age: 67
End: 2022-10-27

## 2022-10-27 NOTE — TELEPHONE ENCOUNTER
Spoke with patient after he LVM requesting a call back. He was provided [facility] PAT phone number to get his covid test scheduled. We also discussed his cardiac clearance. Call made to Dr. Odessa Blandon office to request cardiac clearance note. Spoke with Madalyn Meehan and provided our office information. Awaiting fax or a phone call from their office.

## 2022-10-28 ENCOUNTER — TELEPHONE (OUTPATIENT)
Dept: SURGERY | Facility: CLINIC | Age: 67
End: 2022-10-28

## 2022-10-28 NOTE — TELEPHONE ENCOUNTER
Patient called for COVID test update. I informed him that PAT will call him to schedule, he mentioned that he has called PAT a few times with no call back. I called PAT to have them call patient.

## 2022-10-29 ENCOUNTER — LAB ENCOUNTER (OUTPATIENT)
Dept: LAB | Age: 67
End: 2022-10-29
Attending: SURGERY
Payer: MEDICARE

## 2022-10-29 DIAGNOSIS — Z01.818 PRE-OP TESTING: ICD-10-CM

## 2022-10-30 LAB — SARS-COV-2 RNA RESP QL NAA+PROBE: NOT DETECTED

## 2022-11-01 ENCOUNTER — ANESTHESIA (OUTPATIENT)
Dept: SURGERY | Facility: HOSPITAL | Age: 67
End: 2022-11-01
Payer: MEDICARE

## 2022-11-01 ENCOUNTER — ANESTHESIA EVENT (OUTPATIENT)
Dept: SURGERY | Facility: HOSPITAL | Age: 67
End: 2022-11-01
Payer: MEDICARE

## 2022-11-01 ENCOUNTER — HOSPITAL ENCOUNTER (OUTPATIENT)
Facility: HOSPITAL | Age: 67
Setting detail: HOSPITAL OUTPATIENT SURGERY
Discharge: HOME | End: 2022-11-01
Attending: SURGERY | Admitting: SURGERY
Payer: MEDICARE

## 2022-11-01 VITALS
WEIGHT: 197 LBS | SYSTOLIC BLOOD PRESSURE: 156 MMHG | BODY MASS INDEX: 25.28 KG/M2 | TEMPERATURE: 97 F | OXYGEN SATURATION: 100 % | RESPIRATION RATE: 18 BRPM | HEIGHT: 74 IN | DIASTOLIC BLOOD PRESSURE: 84 MMHG | HEART RATE: 58 BPM

## 2022-11-01 DIAGNOSIS — H61.111: ICD-10-CM

## 2022-11-01 DIAGNOSIS — Z01.818 PRE-OP TESTING: Primary | ICD-10-CM

## 2022-11-01 LAB
GLUCOSE BLD-MCNC: 108 MG/DL (ref 70–99)
GLUCOSE BLD-MCNC: 118 MG/DL (ref 70–99)

## 2022-11-01 PROCEDURE — 82962 GLUCOSE BLOOD TEST: CPT

## 2022-11-01 PROCEDURE — 88305 TISSUE EXAM BY PATHOLOGIST: CPT | Performed by: SURGERY

## 2022-11-01 PROCEDURE — 0HX2XZZ TRANSFER RIGHT EAR SKIN, EXTERNAL APPROACH: ICD-10-PCS | Performed by: SURGERY

## 2022-11-01 PROCEDURE — 0JU137Z SUPPLEMENT OF FACE SUBCUTANEOUS TISSUE AND FASCIA WITH AUTOLOGOUS TISSUE SUBSTITUTE, PERCUTANEOUS APPROACH: ICD-10-PCS | Performed by: SURGERY

## 2022-11-01 PROCEDURE — 0JN10ZZ RELEASE FACE SUBCUTANEOUS TISSUE AND FASCIA, OPEN APPROACH: ICD-10-PCS | Performed by: SURGERY

## 2022-11-01 RX ORDER — EPHEDRINE SULFATE 50 MG/ML
INJECTION INTRAVENOUS AS NEEDED
Status: DISCONTINUED | OUTPATIENT
Start: 2022-11-01 | End: 2022-11-01 | Stop reason: SURG

## 2022-11-01 RX ORDER — LIDOCAINE HYDROCHLORIDE AND EPINEPHRINE 10; 10 MG/ML; UG/ML
INJECTION, SOLUTION INFILTRATION; PERINEURAL AS NEEDED
Status: DISCONTINUED | OUTPATIENT
Start: 2022-11-01 | End: 2022-11-01 | Stop reason: HOSPADM

## 2022-11-01 RX ORDER — SODIUM CHLORIDE, SODIUM LACTATE, POTASSIUM CHLORIDE, CALCIUM CHLORIDE 600; 310; 30; 20 MG/100ML; MG/100ML; MG/100ML; MG/100ML
INJECTION, SOLUTION INTRAVENOUS CONTINUOUS
Status: DISCONTINUED | OUTPATIENT
Start: 2022-11-01 | End: 2022-11-01

## 2022-11-01 RX ORDER — HYDROMORPHONE HYDROCHLORIDE 1 MG/ML
0.6 INJECTION, SOLUTION INTRAMUSCULAR; INTRAVENOUS; SUBCUTANEOUS EVERY 5 MIN PRN
Status: DISCONTINUED | OUTPATIENT
Start: 2022-11-01 | End: 2022-11-01

## 2022-11-01 RX ORDER — CEFAZOLIN SODIUM/WATER 2 G/20 ML
2 SYRINGE (ML) INTRAVENOUS ONCE
Status: COMPLETED | OUTPATIENT
Start: 2022-11-01 | End: 2022-11-01

## 2022-11-01 RX ORDER — ACETAMINOPHEN 500 MG
1000 TABLET ORAL ONCE
Status: DISCONTINUED | OUTPATIENT
Start: 2022-11-01 | End: 2022-11-01 | Stop reason: HOSPADM

## 2022-11-01 RX ORDER — NALOXONE HYDROCHLORIDE 0.4 MG/ML
80 INJECTION, SOLUTION INTRAMUSCULAR; INTRAVENOUS; SUBCUTANEOUS AS NEEDED
Status: DISCONTINUED | OUTPATIENT
Start: 2022-11-01 | End: 2022-11-01

## 2022-11-01 RX ORDER — ACETAMINOPHEN 500 MG
1000 TABLET ORAL EVERY 6 HOURS PRN
COMMUNITY

## 2022-11-01 RX ORDER — DEXAMETHASONE SODIUM PHOSPHATE 4 MG/ML
VIAL (ML) INJECTION AS NEEDED
Status: DISCONTINUED | OUTPATIENT
Start: 2022-11-01 | End: 2022-11-01 | Stop reason: SURG

## 2022-11-01 RX ORDER — HYDROCODONE BITARTRATE AND ACETAMINOPHEN 5; 325 MG/1; MG/1
2 TABLET ORAL ONCE AS NEEDED
Status: DISCONTINUED | OUTPATIENT
Start: 2022-11-01 | End: 2022-11-01

## 2022-11-01 RX ORDER — HYDROMORPHONE HYDROCHLORIDE 1 MG/ML
0.4 INJECTION, SOLUTION INTRAMUSCULAR; INTRAVENOUS; SUBCUTANEOUS EVERY 5 MIN PRN
Status: DISCONTINUED | OUTPATIENT
Start: 2022-11-01 | End: 2022-11-01

## 2022-11-01 RX ORDER — LABETALOL HYDROCHLORIDE 5 MG/ML
5 INJECTION, SOLUTION INTRAVENOUS EVERY 5 MIN PRN
Status: DISCONTINUED | OUTPATIENT
Start: 2022-11-01 | End: 2022-11-01

## 2022-11-01 RX ORDER — ONDANSETRON 2 MG/ML
4 INJECTION INTRAMUSCULAR; INTRAVENOUS EVERY 6 HOURS PRN
Status: DISCONTINUED | OUTPATIENT
Start: 2022-11-01 | End: 2022-11-01

## 2022-11-01 RX ORDER — DOCUSATE SODIUM 100 MG/1
100 CAPSULE, LIQUID FILLED ORAL 2 TIMES DAILY
Qty: 40 CAPSULE | Refills: 1 | Status: SHIPPED | OUTPATIENT
Start: 2022-11-01

## 2022-11-01 RX ORDER — DEXTROSE MONOHYDRATE 25 G/50ML
50 INJECTION, SOLUTION INTRAVENOUS
Status: DISCONTINUED | OUTPATIENT
Start: 2022-11-01 | End: 2022-11-01 | Stop reason: HOSPADM

## 2022-11-01 RX ORDER — ACETAMINOPHEN 500 MG
1000 TABLET ORAL ONCE AS NEEDED
Status: DISCONTINUED | OUTPATIENT
Start: 2022-11-01 | End: 2022-11-01

## 2022-11-01 RX ORDER — NICOTINE POLACRILEX 4 MG
30 LOZENGE BUCCAL
Status: DISCONTINUED | OUTPATIENT
Start: 2022-11-01 | End: 2022-11-01 | Stop reason: HOSPADM

## 2022-11-01 RX ORDER — ONDANSETRON 4 MG/1
4 TABLET, FILM COATED ORAL EVERY 8 HOURS PRN
Qty: 20 TABLET | Refills: 1 | Status: SHIPPED | OUTPATIENT
Start: 2022-11-01

## 2022-11-01 RX ORDER — GLYCOPYRROLATE 0.2 MG/ML
INJECTION, SOLUTION INTRAMUSCULAR; INTRAVENOUS AS NEEDED
Status: DISCONTINUED | OUTPATIENT
Start: 2022-11-01 | End: 2022-11-01 | Stop reason: SURG

## 2022-11-01 RX ORDER — METOCLOPRAMIDE HYDROCHLORIDE 5 MG/ML
10 INJECTION INTRAMUSCULAR; INTRAVENOUS EVERY 8 HOURS PRN
Status: DISCONTINUED | OUTPATIENT
Start: 2022-11-01 | End: 2022-11-01

## 2022-11-01 RX ORDER — ONDANSETRON 2 MG/ML
INJECTION INTRAMUSCULAR; INTRAVENOUS AS NEEDED
Status: DISCONTINUED | OUTPATIENT
Start: 2022-11-01 | End: 2022-11-01 | Stop reason: SURG

## 2022-11-01 RX ORDER — HYDROCODONE BITARTRATE AND ACETAMINOPHEN 5; 325 MG/1; MG/1
1-2 TABLET ORAL EVERY 6 HOURS PRN
Qty: 20 TABLET | Refills: 0 | Status: SHIPPED | OUTPATIENT
Start: 2022-11-01

## 2022-11-01 RX ORDER — NICOTINE POLACRILEX 4 MG
15 LOZENGE BUCCAL
Status: DISCONTINUED | OUTPATIENT
Start: 2022-11-01 | End: 2022-11-01 | Stop reason: HOSPADM

## 2022-11-01 RX ORDER — HYDROCODONE BITARTRATE AND ACETAMINOPHEN 5; 325 MG/1; MG/1
1 TABLET ORAL ONCE AS NEEDED
Status: DISCONTINUED | OUTPATIENT
Start: 2022-11-01 | End: 2022-11-01

## 2022-11-01 RX ORDER — MIDAZOLAM HYDROCHLORIDE 1 MG/ML
1 INJECTION INTRAMUSCULAR; INTRAVENOUS EVERY 5 MIN PRN
Status: DISCONTINUED | OUTPATIENT
Start: 2022-11-01 | End: 2022-11-01

## 2022-11-01 RX ORDER — PHENYLEPHRINE HCL 10 MG/ML
VIAL (ML) INJECTION AS NEEDED
Status: DISCONTINUED | OUTPATIENT
Start: 2022-11-01 | End: 2022-11-01 | Stop reason: SURG

## 2022-11-01 RX ORDER — MEPERIDINE HYDROCHLORIDE 25 MG/ML
12.5 INJECTION INTRAMUSCULAR; INTRAVENOUS; SUBCUTANEOUS AS NEEDED
Status: DISCONTINUED | OUTPATIENT
Start: 2022-11-01 | End: 2022-11-01

## 2022-11-01 RX ORDER — LIDOCAINE HYDROCHLORIDE 10 MG/ML
INJECTION, SOLUTION EPIDURAL; INFILTRATION; INTRACAUDAL; PERINEURAL AS NEEDED
Status: DISCONTINUED | OUTPATIENT
Start: 2022-11-01 | End: 2022-11-01 | Stop reason: SURG

## 2022-11-01 RX ORDER — HYDROMORPHONE HYDROCHLORIDE 1 MG/ML
0.2 INJECTION, SOLUTION INTRAMUSCULAR; INTRAVENOUS; SUBCUTANEOUS EVERY 5 MIN PRN
Status: DISCONTINUED | OUTPATIENT
Start: 2022-11-01 | End: 2022-11-01

## 2022-11-01 RX ADMIN — EPHEDRINE SULFATE 10 MG: 50 INJECTION INTRAVENOUS at 11:58:00

## 2022-11-01 RX ADMIN — PHENYLEPHRINE HCL 100 MCG: 10 MG/ML VIAL (ML) INJECTION at 11:46:00

## 2022-11-01 RX ADMIN — DEXAMETHASONE SODIUM PHOSPHATE 4 MG: 4 MG/ML VIAL (ML) INJECTION at 11:05:00

## 2022-11-01 RX ADMIN — SODIUM CHLORIDE, SODIUM LACTATE, POTASSIUM CHLORIDE, CALCIUM CHLORIDE: 600; 310; 30; 20 INJECTION, SOLUTION INTRAVENOUS at 12:09:00

## 2022-11-01 RX ADMIN — LIDOCAINE HYDROCHLORIDE 50 MG: 10 INJECTION, SOLUTION EPIDURAL; INFILTRATION; INTRACAUDAL; PERINEURAL at 10:59:00

## 2022-11-01 RX ADMIN — PHENYLEPHRINE HCL 100 MCG: 10 MG/ML VIAL (ML) INJECTION at 11:32:00

## 2022-11-01 RX ADMIN — GLYCOPYRROLATE 0.2 MG: 0.2 INJECTION, SOLUTION INTRAMUSCULAR; INTRAVENOUS at 11:11:00

## 2022-11-01 RX ADMIN — EPHEDRINE SULFATE 10 MG: 50 INJECTION INTRAVENOUS at 11:14:00

## 2022-11-01 RX ADMIN — CEFAZOLIN SODIUM/WATER 2 G: 2 G/20 ML SYRINGE (ML) INTRAVENOUS at 11:03:00

## 2022-11-01 RX ADMIN — SODIUM CHLORIDE, SODIUM LACTATE, POTASSIUM CHLORIDE, CALCIUM CHLORIDE: 600; 310; 30; 20 INJECTION, SOLUTION INTRAVENOUS at 10:55:00

## 2022-11-01 RX ADMIN — ONDANSETRON 4 MG: 2 INJECTION INTRAMUSCULAR; INTRAVENOUS at 12:58:00

## 2022-11-01 RX ADMIN — GLYCOPYRROLATE 0.2 MG: 0.2 INJECTION, SOLUTION INTRAMUSCULAR; INTRAVENOUS at 11:13:00

## 2022-11-01 NOTE — ANESTHESIA POSTPROCEDURE EVALUATION
[address] Patient Status:  Hospital Outpatient Surgery   Age/Gender 79year old male MRN [id number]   Location [address] [facility] Attending Cuauthemoc Tam MD   Hosp Day # 0 PCP Ellyn Foreman MD       Anesthesia Post-op Note    Right ear reconstruction, otoplasty,local tissue rearrangement  autologous fat grafting,dermal fat graft, left ear biopsy and wound closure    Procedure Summary     Date: 11/01/22 Room / Location: [facility] OR 02 / 1404 [facility] OR    Anesthesia Start: 1055 Anesthesia Stop: 6656    Procedures:       Right ear reconstruction, otoplasty,local tissue rearrangement  autologous fat grafting,dermal fat graft, left ear biopsy and wound closure (Bilateral)      facial fat grafting Diagnosis:       Acquired deformity of right ear      (Acquired deformity of right ear [H61.111])    Surgeons: Cuauhtemoc Tam MD Anesthesiologist: Mili Skinner MD    Anesthesia Type: general ASA Status: 2          Anesthesia Type: general    Vitals Value Taken Time   /90 11/01/22 1410   Temp 97.3 11/01/22 1410   Pulse 63 11/01/22 1410   Resp 10 11/01/22 1410   SpO2 98 11/01/22 1410       Patient Location: PACU    Anesthesia Type: general    Airway Patency: patent and extubated    Postop Pain Control: adequate    Mental Status: preanesthetic baseline    Nausea/Vomiting: none    Cardiopulmonary/Hydration status: stable euvolemic    Complications: no apparent anesthesia related complications    Postop vital signs: stable    Dental Exam: Unchanged from Preop    Patient to be discharged from PACU when criteria met.

## 2022-11-01 NOTE — BRIEF OP NOTE
Pre-Operative Diagnosis: Acquired deformity of right ear [H61.111]     Post-Operative Diagnosis: Acquired deformity of right ear [H61.111]      Procedure Performed:   Right ear reconstruction, otoplasty,local tissue rearrangement  autologous fat grafting,dermal fat graft, left ear biopsy and wound closure    Surgeon(s) and Role:     Fabian Fletcher MD - Primary    Assistant(s):   none     Surgical Findings:see dictation     Specimen: see path    Estimated Blood Loss: Blood Output: 5 mL (11/1/2022  2:05 PM)      Dictation Number: [id number]  Rea Martinez MD  11/1/2022  2:39 PM

## 2022-11-01 NOTE — ANESTHESIA PROCEDURE NOTES
Airway  Date/Time: 11/1/2022 11:01 AM  Urgency: elective    Airway not difficult    General Information and Staff    Patient location during procedure: OR  Anesthesiologist: Chip Gonzalez MD  Resident/CRNA: Elsa Garrett CRNA  Performed: CRNA     Indications and Patient Condition  Indications for airway management: anesthesia  Spontaneous Ventilation: absent  Sedation level: deep  Preoxygenated: yes  Patient position: sniffing  Mask difficulty assessment: 0 - not attempted    Final Airway Details  Final airway type: supraglottic airway      Successful airway: classic  Size 4       Number of attempts at approach: 1  Number of other approaches attempted: 0    Additional Comments  Dentition per pre op

## 2022-11-01 NOTE — DISCHARGE INSTRUCTIONS
Plastic Surgery Home Care Instructions      We hope you were pleased with your care at [facility].  We wish you the best outcome and overall experience with your operation. These instructions will help to minimize pain, optimize healing, and improve the likelihood of a successful result. What To Expect  There may be some spotting of the incision lines for the next few days. Mild bruising, mild swelling and discomfort in the surgical area is typical.   Temporary areas of numbness are typical in the early weeks following your procedure. Normalization of sensation can typically take up to several months following the operation. Mild bruising, swelling and discomfort in areas of liposuction is expected    Bandages (Dressing)  Wear abdominal binder and foam at all times. Leave steri-strips in place. Apply antibiotic ointment (Neosporin, bacitracin etc) daily around ears    Bathing/Showers  You can resume showering tomorrow. Let soap and water run over the incisions, don't scrub. No baths, swimming, or hot tubs until you receive medical permission    Pain Medication: Norco  Take one to two tablets every six hours as needed for pain. Do not take narcotics, if you do not have pain. Keep stools soft. Take a stool softener (Metamucil, Milk of Magnesia, Colace). Eating fruit will also help to prevent constipation    Over-The-Counter Medication  You can take Tylenol after surgery for pain relief as needed if you are not taking Norco. Don't take both together.   You can take Aleve or ibuprofen starting 24 hours after surgery  Take as directed on the bottle    Home Medication  Resume your home medications as instructed  Do not resume herbal medications for two weeks    Diet  Resume your normal diet    Activity  No strenuous activity   Sleep on your back (avoid pressure on ears)  You cannot return to physical exercise, sports, or gym workouts until you are allowed to participate in strenuous activity. Driving  Do not drive, if you are taking pain medication. Return to Work or [employer] can return to work when you are not taking pain medication, if your work does not involve strenuous activity. Contact the office, if you need a medical note. Follow-up Appointment with  Robby Tirado PA-C on 11/7/2022 for suture removal and on 11/16/2022 with Dr. Patricia Cheney office will call you to set up a time    Questions or Concerns  Call Dr. Myranda Whatley office if you experience severe pain not controlled by pain medication, swelling, numbness, tingling, bleeding, fever, or other concerns. If she is not available, another physician will be able to address your concerns. Sari Galaviz   Thank you for coming to [facility] for your operation. The nurses and the anesthesiologist try very hard to make sure you receive the best care possible. Your trust in them is greatly appreciated.     Thanks so much,  Dr. Michael Martinez

## 2022-11-02 NOTE — OPERATIVE REPORT
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 PROCEDURE:  1. Right ear reconstruction with otoplasty. 2.   Local tissue rearrangement, right earlobe, 2 sq cm. 3.   Autologous fat grafting from abdomen with LipoGrafter, 63 mL, to right face; dermal fat graft from right groin to right infraauricular area. ANESTHESIA:  General endotracheal anesthesia. COMPLICATIONS:  None. BLOOD LOSS:  Minimal.    INDICATIONS:  This is a 71-year-old gentleman who had a major reconstruction of his right neck, ear, and infraauricular area after skin cancer resection 1-1/2 years ago. This was done with a deltopectoral flap and local tissue rearrangement, and the patient had developed significant ear deformity with inability to wear his mask or glasses properly. He was seen in the office, and we discussed reconstructive options. Given the scar contracture and previous radiation, as well as the skin flap, we discussed a staged approach with release of the scar contracture, otoplasty setback for functional purposes, as well as local tissue rearrangement and fat grafting to the area. Potential risks, complications, benefits and alternatives were discussed.   Risks and complications include, but are not limited to, infection, bleeding, scarring, damage to surrounding structures, hematoma, seroma, fat graft resorption, fat necrosis, facial nerve injury, dehiscence, recurrent ear deformity, persistent ear deformity, need for further surgery. The patient understands and wishes to proceed. Questions were answered. OPERATIVE TECHNIQUE:  Patient was identified in the preoperative area, informed consent was obtained, and site was marked. Patient was then brought back to the operating room and placed in supine position. He was adequately padded, and sequential compression devices were applied. General endotracheal anesthesia was administered. Perioperative antibiotics were given. Then, his entire face and neck were prepped and draped in the usual sterile fashion. Then, the procedure was started with infiltration of tumescent solution into his lower abdomen and right flank; 1 L of LR with 1 ampule of epinephrine 1:100,000 and 50 mL of 1% lidocaine were used. This was mixed up, and a total of 400 mL was infiltrated. This was allowed to take its effect. Then, 1% lidocaine with epinephrine was injected surrounding the infraauricular area. Then, a 15 blade was used to incise the junction of the deformed partial ear to the flap, and the scar contracture was released. This was done using a Herzog nerve dissector, tenotomy scissors, as well as bipolar forceps. After release of that area, the ear was not in a position to reposition it properly. This was done by making an additional incision in the retroauricular area for the otoplasty. Then, the dissection was carried down to the mastoid fascia as well as on the back of the cartilage for the helix and antihelix. Then, Mustarde setback sutures were placed, and a 4-0 PDS suture was used. A total of 4 sutures were placed to position the ear slightly more superior, and there was an adequate setback component. This gave stability to the ear. Those were then tied, and then lipoaspiration with the [employer] system was performed.   A total of 63 mL was aspirated and then injected with 3 mL syringes into the right preauricular and infraauricular area. This was done with a feathering technique and slowly to ensure proper placement without injury to the facial nerve or the overlying skin. The tissue appeared very scarred and dense in that area, and therefore, no additional fat grafting was harvested for the lipoaspiration. Nevertheless, there was an area around the inferior aspect of the ear with protrusion of the antihelix at the distal aspect. A pocket was created, and then a dermal fat graft was harvested from the right groin area and then placed into that area to soften and improve the appearance of that junction. Dermal fat graft was then secured in place with 4-0 Vicryl sutures. The donor site was closed with 4-0 Vicryl sutures for the deep dermal layer, followed by 5-0 Monocryl subcuticular sutures for the skin. The aspiration puncture site was closed with 4-0 Vicryl suture for the deep dermal layer. Exofin glue was applied to both incisions. The total amount of fat injected was 63 mL, and this gave significant improvement of the shape and contour of the area. Then, the local tissue rearrangement of the inferior aspect of the ear after release of the scar contracture was performed with a small Z-plasty. This was then inset with 4-0 interrupted Vicryl sutures, and 5-0 Prolene sutures were used for the skin. A small portion of the excess skin was trimmed for the ear setback otoplasty. This was then inset with 4-0 and 5-0 interrupted Vicryl sutures, followed by 5-0 Prolene sutures. At the end of the procedure when comparing the ears for acceptable symmetry, it was noted that the patient had a suspicious skin lesion on his left ear near the antihelix. This area was then biopsied and sent to Pathology. It was marked with a marking stitch at the 12-o'clock superior aspect. Then, 5-0 Vicryl suture was used for the deep layer closure, followed by 5-0 Prolene sutures. The area excised and closed was measuring 7 mm. Bacitracin ointment was applied to all incisions. TopiFoam and an abdominal binder were applied to his abdomen. The patient tolerated the procedure well and awoke from anesthesia without difficulty. All sponge, instrument, and needle counts were correct at the end of the case. The patient was seen in Recovery before discharge and had completely normal facial nerve function on both sides, and for additional support at that time, a head dressing with 2 Kerlix was applied. Dictated By Dianne Harris.  Letty Moscoso MD  d: 11/01/2022 17:19:12  t: 11/01/2022 21:46:23  Job [id number]  DBI/

## 2022-11-07 ENCOUNTER — TELEPHONE (OUTPATIENT)
Dept: RADIATION ONCOLOGY | Facility: HOSPITAL | Age: 67
End: 2022-11-07

## 2022-11-07 ENCOUNTER — OFFICE VISIT (OUTPATIENT)
Dept: SURGERY | Facility: CLINIC | Age: 67
End: 2022-11-07
Payer: COMMERCIAL

## 2022-11-07 DIAGNOSIS — H61.111: Primary | ICD-10-CM

## 2022-11-07 NOTE — PROGRESS NOTES
Daniella Espinoza is a 79year old male who presents today for a follow-up after right ear reconstruction with otoplasty, local tissue rearrangement right earlobe, autologous fat grafting from abdomen to right face, dermal fat graft from right groin to right infra-auricular area, left ear biopsy 11/1/2022 with Dr. Iliana Servin. He denies fever and chills. He denies new nausea, vomiting, diarrhea or constipation. His pain is controlled. He has been compliant with wearing his abdominal binder and wearing his headband. Physical Exam     HEENT: Bilateral ear incisions clean, dry and intact without wound drainage or wound dehiscence, Prolene sutures in place. No evidence of hematoma. No erythema. Abdominal incisions clean, dry and intact without wound drainage, wound dehiscence or erythema. No evidence of hematoma. Abdomen soft and nontender. There were no vitals filed for this visit. Assessment and Plan     Daniella Espinoza is doing well s/p right ear reconstruction with otoplasty, local tissue rearrangement right earlobe, autologous fat grafting from abdomen to right face, dermal fat graft from right groin to right infra-auricular area, left ear biopsy 11/1/2022 with Dr. Iliana Servin. The Prolene sutures were removed. Neosporin ointment was applied. Photos were taken today. I recommend he apply antibiotic ointment to the area incisions daily. He should continue wearing his headband and continue with activity restrictions. New Steri-Strips were placed over the abdominal incisions. We discussed that wearing his abdominal binder is not medically necessary but he can continue wearing compression for support if he would like. He will follow-up with Dr. Iliana Servin in 1 week for wound check and to discuss treatment options for his left ear squamous cell carcinoma that was found at surgery. Questions were answered. Patient understands.      Lisa Marques  11/7/2022  4:23 PM

## 2022-11-09 ENCOUNTER — APPOINTMENT (OUTPATIENT)
Dept: RADIATION ONCOLOGY | Facility: HOSPITAL | Age: 67
End: 2022-11-09
Attending: RADIOLOGY
Payer: MEDICARE

## 2022-11-11 ENCOUNTER — HOSPITAL ENCOUNTER (OUTPATIENT)
Dept: NUCLEAR MEDICINE | Facility: HOSPITAL | Age: 67
Discharge: HOME | End: 2022-11-11
Attending: SURGERY
Payer: MEDICARE

## 2022-11-11 DIAGNOSIS — C44.42 SQUAMOUS CELL CARCINOMA OF NECK: ICD-10-CM

## 2022-11-11 DIAGNOSIS — C77.0 SECONDARY MALIGNANCY OF CERVICOFACIAL LYMPH NODE (HCC): ICD-10-CM

## 2022-11-11 LAB — GLUCOSE BLD-MCNC: 106 MG/DL (ref 70–99)

## 2022-11-11 PROCEDURE — 82962 GLUCOSE BLOOD TEST: CPT

## 2022-11-11 PROCEDURE — 78815 PET IMAGE W/CT SKULL-THIGH: CPT | Performed by: SURGERY

## 2022-11-16 ENCOUNTER — OFFICE VISIT (OUTPATIENT)
Dept: SURGERY | Facility: CLINIC | Age: 67
End: 2022-11-16
Payer: COMMERCIAL

## 2022-11-16 VITALS
BODY MASS INDEX: 25 KG/M2 | RESPIRATION RATE: 16 BRPM | SYSTOLIC BLOOD PRESSURE: 168 MMHG | OXYGEN SATURATION: 99 % | TEMPERATURE: 97 F | HEART RATE: 58 BPM | WEIGHT: 191 LBS | DIASTOLIC BLOOD PRESSURE: 83 MMHG

## 2022-11-16 DIAGNOSIS — C44.42 SQUAMOUS CELL CARCINOMA OF NECK: Primary | ICD-10-CM

## 2022-11-16 DIAGNOSIS — R59.0 LYMPHADENOPATHY, AXILLARY: ICD-10-CM

## 2022-11-16 DIAGNOSIS — H61.111: Primary | ICD-10-CM

## 2022-11-16 PROBLEM — R94.8 ABNORMAL POSITRON EMISSION TOMOGRAPHY (PET) SCAN: Status: ACTIVE | Noted: 2022-11-16

## 2022-11-16 PROCEDURE — 3077F SYST BP >= 140 MM HG: CPT | Performed by: SURGERY

## 2022-11-16 PROCEDURE — 99213 OFFICE O/P EST LOW 20 MIN: CPT | Performed by: SURGERY

## 2022-11-16 PROCEDURE — 3079F DIAST BP 80-89 MM HG: CPT | Performed by: SURGERY

## 2022-11-16 PROCEDURE — 1126F AMNT PAIN NOTED NONE PRSNT: CPT | Performed by: SURGERY

## 2022-11-16 PROCEDURE — 99024 POSTOP FOLLOW-UP VISIT: CPT | Performed by: SURGERY

## 2022-11-21 ENCOUNTER — TELEPHONE (OUTPATIENT)
Dept: SURGERY | Facility: CLINIC | Age: 67
End: 2022-11-21

## 2022-11-21 DIAGNOSIS — C44.229 SQUAMOUS CELL CARCINOMA OF LEFT EAR: Primary | ICD-10-CM

## 2022-11-29 ENCOUNTER — HOSPITAL ENCOUNTER (OUTPATIENT)
Facility: HOSPITAL | Age: 67
Setting detail: HOSPITAL OUTPATIENT SURGERY
Discharge: HOME | End: 2022-11-29
Attending: SURGERY | Admitting: SURGERY
Payer: MEDICARE

## 2022-11-29 VITALS
TEMPERATURE: 97 F | WEIGHT: 191 LBS | DIASTOLIC BLOOD PRESSURE: 80 MMHG | OXYGEN SATURATION: 99 % | SYSTOLIC BLOOD PRESSURE: 144 MMHG | BODY MASS INDEX: 25 KG/M2 | HEART RATE: 69 BPM | RESPIRATION RATE: 22 BRPM

## 2022-11-29 DIAGNOSIS — C44.229 SQUAMOUS CELL CARCINOMA OF LEFT EAR: ICD-10-CM

## 2022-11-29 PROCEDURE — 0JB10ZX EXCISION OF FACE SUBCUTANEOUS TISSUE AND FASCIA, OPEN APPROACH, DIAGNOSTIC: ICD-10-PCS | Performed by: SURGERY

## 2022-11-29 PROCEDURE — 88305 TISSUE EXAM BY PATHOLOGIST: CPT | Performed by: SURGERY

## 2022-11-29 PROCEDURE — 88331 PATH CONSLTJ SURG 1 BLK 1SPC: CPT | Performed by: SURGERY

## 2022-11-29 PROCEDURE — 0JR107Z REPLACEMENT OF FACE SUBCUTANEOUS TISSUE AND FASCIA WITH AUTOLOGOUS TISSUE SUBSTITUTE, OPEN APPROACH: ICD-10-PCS | Performed by: SURGERY

## 2022-11-29 RX ORDER — LIDOCAINE HYDROCHLORIDE AND EPINEPHRINE 10; 10 MG/ML; UG/ML
INJECTION, SOLUTION INFILTRATION; PERINEURAL AS NEEDED
Status: DISCONTINUED | OUTPATIENT
Start: 2022-11-29 | End: 2022-11-29 | Stop reason: HOSPADM

## 2022-11-29 NOTE — BRIEF OP NOTE
Pre-Operative Diagnosis: Squamous cell carcinoma of left ear [C44.229]     Post-Operative Diagnosis: Squamous cell carcinoma of left ear [C44.229]      Procedure Performed:   Excision of squamous cell carcinoma left ear with intraoperative frozen sections, reconstruction with full thickness skin graft.     Surgeon(s) and Role:     Silvano Pinto MD - Primary    Assistant(s):  PA: LEXIS Johnson     Surgical Findings: see dictation     Specimen: see pathology     Estimated Blood Loss: Blood Output: 5 mL (11/29/2022 12:22 PM)    LEXIS Bell  11/29/2022  12:34 PM

## 2022-11-29 NOTE — DISCHARGE INSTRUCTIONS
Plastic Surgery Home Care Instructions      We hope you were pleased with your care at [facility].  We wish you the best outcome and overall experience with your operation. These instructions will help to minimize pain, optimize healing, and improve the likelihood of a successful result. What To Expect  There may be some spotting of the incision lines for the next few days. Mild bruising, mild swelling and discomfort in the surgical area is typical.   Temporary areas of numbness are typical in the early weeks following your procedure. Normalization of sensation can typically take up to several months following the operation. Bandages (Dressing)  Leave yellow gauze in place. Apply antibiotic ointment (Neosporin, bacitracin etc) daily around yellow gauze  Leave white steri-strips in place    Bathing/Showers  DO NOT get surgical area wet  No baths, swimming, or hot tubs until you receive medical permission    Over-The-Counter Medication  You can take Tylenol after surgery for pain relief as needed  You can take Aleve or ibuprofen starting 24 hours after surgery  Take as directed on the bottle    Home Medication  Resume your home medications as instructed  Do not resume herbal medications for two weeks    Diet  Resume your normal diet    Activity  No strenuous activity   You cannot return to physical exercise, sports, or gym workouts until you are allowed to participate in strenuous activity. Don't sleep on operative ear. Return to Work or [place] can return to work when you are not taking pain medication, if your work does not involve strenuous activity. Contact the office, if you need a medical note.      Follow-up Appointment with Rhiannon Iverson PA-C on 12/5/2022 and with Dr. Rosemarie Hall on 12/14/2022  Our office will call you to set up a time    Questions or Concerns  Call Dr. Jeralene Romberg office if you experience severe pain not controlled by pain medication, swelling, numbness, tingling, bleeding, fever, or other concerns. If she is not available, another physician will be able to address your concerns. Brendan Palomino   Thank you for coming to [facility] for your operation. The nurses and the anesthesiologist try very hard to make sure you receive the best care possible. Your trust in them is greatly appreciated.     Thanks so much,  Dr. Betty Menendez PA-C

## 2022-11-30 NOTE — OPERATIVE REPORT
[facility]    PATIENT'S NAME: FIORELLA AMAYA   ATTENDING PHYSICIAN: Rea Hanson MD   OPERATING PHYSICIAN: Rea Hanson MD   PATIENT ACCOUNT#:   [id number]    LOCATION:  [address]  MEDICAL RECORD #:   [id number]       YOB: 1955  ADMISSION DATE:       11/29/2022      OPERATION DATE:  11/29/2022    OPERATIVE REPORT      PREOPERATIVE DIAGNOSIS:  Squamous cell cancer, left ear. POSTOPERATIVE DIAGNOSIS:  Squamous cell cancer, left ear. PROCEDURE:    1. Excision of squamous cell cancer, left ear; intraoperative frozen section, 1.5 x 1.5 cm.  2.   Full-thickness skin graft from left preauricular area to left ear, 2 sq cm. ASSISTANT:  Suzy Elmore PA-C. ANESTHESIA:  Local anesthesia. COMPLICATIONS:  None. BLOOD LOSS:  Minimal.    INDICATIONS:  This is a 26-year-old gentleman with a squamous cell cancer of left ear, who was seen in the office to discuss surgical excision with intraoperative frozen section and reconstruction with skin graft and local tissue rearrangement. We discussed the potential risks, complications, benefits, and alternatives. Risks and complications included, but were not limited to, infection, bleeding, scarring, damage to surrounding structures, hematoma, seroma, graft failure, ear deformity, need for further surgery. The patient understands and wishes to proceed. Questions were answered. OPERATIVE TECHNIQUE:  Patient was identified in the preoperative area. Informed consent was obtained. The site was marked. Patient was then brought back to the operating room and placed in supine position. He was adequately padded. Then his face was prepped and draped in usual sterile fashion. Then 1% lidocaine with epinephrine was injected surrounding the squamous cell cancer on his left ear.   The microscopic margins were marked and then a 2 mm margin was marked around it and frozen sections were taken all the way circumferentially from the margins. They were sent to Pathology and returned negative. The entire specimen was sent for permanent evaluation. Then full-thickness graft from the left preauricular area was harvested, 2 x 1 cm, was defatted and placed onto the left ear antihelical area. This was secured with 5-0 chromic sutures. Then Xeroform bolster was applied and secured with 4-0 silk sutures. The donor site was closed by undermining the cheek subcutaneous tissue and then closing with 5-0 interrupted Vicryl sutures for the deep dermal layer and 6-0 Prolene sutures for the skin. Steri-Strips were applied to the donor site. Patient tolerated the procedure well and was brought to Recovery in a stable condition. All sponge, instrument, and needle counts were correct at the end of the case. Dictated By Nikky Burgre.  Megan Conteh MD  d: 11/29/2022 18:12:56  t: 11/30/2022 05:53:02  Job [id number]  [facility]/

## 2022-12-05 ENCOUNTER — OFFICE VISIT (OUTPATIENT)
Dept: SURGERY | Facility: CLINIC | Age: 67
End: 2022-12-05
Payer: COMMERCIAL

## 2022-12-05 DIAGNOSIS — C44.229 SQUAMOUS CELL CARCINOMA OF LEFT EAR: Primary | ICD-10-CM

## 2022-12-05 NOTE — PROGRESS NOTES
Hailey Lawrence is a 79year old male who presents today for a follow-up after excision of squamous cell cancer left ear with intraoperative frozen section and full-thickness skin graft from left preauricular area to left ear with Dr. Kim Grigsby on 11/29/2022. He denies fever and chills. He denies nausea, vomiting, diarrhea or constipation. His pain is controlled. Physical Exam      HEENT: Left ear bolster removed. Skin graft adherent. No wound drainage or surrounding erythema. Left preauricular incision with Prolene sutures in place, clean, dry and intact without wound drainage or wound dehiscence. No erythema. There were no vitals filed for this visit. Assessment and Plan     Hailey Lawrence is doing well s/p excision of squamous cell cancer left ear with intraoperative frozen section and full-thickness skin graft from left preauricular area to left ear with Dr. Kim Grigsby on 11/29/2022. The Xeroform bolster and Prolene sutures were removed. Neosporin ointment was applied to the incision and skin graft followed by Xeroform and gauze. He should change the dressing over the skin graft daily. I recommend he continue with activity restrictions. He should not get the skin graft wet. He will follow-up in 1 week with Dr. Kim Grigsby for repeat wound check. He was instructed to call with any questions or concerns. Questions were answered. Patient understands.      Lisa Hernandez  12/5/2022  4:23 PM

## 2022-12-14 ENCOUNTER — OFFICE VISIT (OUTPATIENT)
Dept: SURGERY | Facility: CLINIC | Age: 67
End: 2022-12-14
Payer: COMMERCIAL

## 2022-12-14 DIAGNOSIS — C44.229 SQUAMOUS CELL CARCINOMA OF LEFT EAR: Primary | ICD-10-CM

## 2022-12-14 PROCEDURE — 99024 POSTOP FOLLOW-UP VISIT: CPT | Performed by: SURGERY

## 2022-12-14 NOTE — PROGRESS NOTES
Aldo Osborn is a 79year old male who presents today for a follow-up after excision of squamous cell cancer left ear with intraoperative frozen section and full-thickness skin graft from left preauricular area to left ear on 11/29/2022. He denies fever and chills. He denies nausea, vomiting, diarrhea or constipation. His pain is controlled. Physical Exam     Surgical incisions are clean, dry, and intact. No erythema, no wound drainage. HEENT: Left preauricular incision clean, dry and intact with 1 spitting Vicryl suture that was removed today. No erythema or wound drainage. Skin graft adherent with some slow healing and scabbing of approximately one fourth of the graft. There were no vitals filed for this visit. Assessment and Plan     Aldo Osborn is doing well s/p excision of squamous cell cancer left ear with intraoperative frozen section and full-thickness skin graft from left preauricular area to left ear  on 11/29/2022. Patient has been applying a white cream to the incisions and skin graft. This was cleaned today and Neosporin was generously applied over the skin graft as well as the incision. Xeroform was applied over the skin graft. He was instructed to apply Neosporin ointment 3-4 times daily and continue to wear the Xeroform. He should discontinue using the white cream.  He should still not get this area wet. He will follow-up in 1 week for repeat wound check and in the new year with me. Questions were answered. Patient understands.

## 2022-12-16 DIAGNOSIS — C44.229 SQUAMOUS CELL CARCINOMA OF LEFT EAR: Primary | ICD-10-CM

## 2022-12-21 ENCOUNTER — OFFICE VISIT (OUTPATIENT)
Dept: SURGERY | Facility: CLINIC | Age: 67
End: 2022-12-21
Payer: COMMERCIAL

## 2022-12-21 DIAGNOSIS — C44.229 SQUAMOUS CELL CARCINOMA OF LEFT EAR: Primary | ICD-10-CM

## 2022-12-21 PROCEDURE — 99024 POSTOP FOLLOW-UP VISIT: CPT | Performed by: PHYSICIAN ASSISTANT

## 2022-12-21 NOTE — PROGRESS NOTES
Nirali Zamora is a 79year old male who presents today for a follow-up after excision of squamous cell cancer left ear with intraoperative frozen section and full-thickness skin graft from left preauricular area to left ear on 11/29/2022. He denies fever and chills. He denies nausea, vomiting, diarrhea or constipation. His pain is controlled. Physical Exam     Surgical incisions are clean, dry, and intact. No erythema, no wound drainage. HEENT: Left preauricular incision clean, dry and intact without wound drainage or wound dehiscence. No erythema. Skin graft with some slow healing measuring approximately 2 x 3 mm anteriorly. The remainder of the graft has 100% take. No wound drainage or erythema. There were no vitals filed for this visit. Assessment and Plan     Nirali Zamora is doing well s/p excision of squamous cell cancer left ear with intraoperative frozen section and full-thickness skin graft from left preauricular area to left ear on 11/29/2022    A new dressing of antibiotic ointment and Xeroform was applied. He will continue to apply this twice daily until the area of slow healing has completely healed. He can resume showering the area gently but should not scrub it. We will follow-up in 2 to 3 weeks with Dr. Davi Ahumada for repeat wound check. He was instructed to call with any questions or concerns in the meantime. Questions were answered. Patient understands.      Lisa Ramirez  12/21/2022  10:54 AM

## 2023-01-11 ENCOUNTER — OFFICE VISIT (OUTPATIENT)
Dept: SURGERY | Facility: CLINIC | Age: 68
End: 2023-01-11
Payer: MEDICARE

## 2023-01-11 VITALS
DIASTOLIC BLOOD PRESSURE: 84 MMHG | HEART RATE: 66 BPM | WEIGHT: 205 LBS | SYSTOLIC BLOOD PRESSURE: 167 MMHG | BODY MASS INDEX: 26 KG/M2 | OXYGEN SATURATION: 99 % | RESPIRATION RATE: 16 BRPM | TEMPERATURE: 97 F

## 2023-01-11 DIAGNOSIS — C44.229 SQUAMOUS CELL CARCINOMA OF LEFT EAR: Primary | ICD-10-CM

## 2023-01-11 PROCEDURE — 99212 OFFICE O/P EST SF 10 MIN: CPT | Performed by: SURGERY

## 2023-01-11 PROCEDURE — 1126F AMNT PAIN NOTED NONE PRSNT: CPT | Performed by: SURGERY

## 2023-01-11 PROCEDURE — 3079F DIAST BP 80-89 MM HG: CPT | Performed by: SURGERY

## 2023-01-11 PROCEDURE — 3077F SYST BP >= 140 MM HG: CPT | Performed by: SURGERY

## 2023-01-11 PROCEDURE — 99024 POSTOP FOLLOW-UP VISIT: CPT | Performed by: SURGERY

## 2023-01-13 ENCOUNTER — TELEPHONE (OUTPATIENT)
Dept: SURGERY | Facility: CLINIC | Age: 68
End: 2023-01-13

## 2023-01-13 NOTE — TELEPHONE ENCOUNTER
LVM for pt regarding the bill he received from his procedure with Dr. Ankit Bains on 11/1/2022. Let him know that it has been submitted for a reconsideration and this could take anywhere from 30-90 days to receive a response back from insurance. Stated I will follow up with him again once a decision has been made. Left my direct number should he have any other questions or concerns.

## 2023-03-22 NOTE — TELEPHONE ENCOUNTER
Patient returning call to discuss surgery scheduling. Offered patient 11/29/2022 at [facility] with Dr. Ankit Bains. Patient agreed.
pt denies

## 2023-05-05 ENCOUNTER — HOSPITAL ENCOUNTER (OUTPATIENT)
Dept: NUCLEAR MEDICINE | Facility: HOSPITAL | Age: 68
Discharge: HOME | End: 2023-05-05
Attending: PHYSICIAN ASSISTANT
Payer: MEDICARE

## 2023-05-05 DIAGNOSIS — C44.229 SQUAMOUS CELL CARCINOMA OF LEFT EAR: ICD-10-CM

## 2023-05-05 LAB — GLUCOSE BLD-MCNC: 129 MG/DL (ref 70–99)

## 2023-05-05 PROCEDURE — 82962 GLUCOSE BLOOD TEST: CPT

## 2023-05-05 PROCEDURE — 78815 PET IMAGE W/CT SKULL-THIGH: CPT | Performed by: PHYSICIAN ASSISTANT

## 2023-05-24 ENCOUNTER — OFFICE VISIT (OUTPATIENT)
Dept: SURGERY | Facility: CLINIC | Age: 68
End: 2023-05-24
Payer: MEDICARE

## 2023-05-24 VITALS
DIASTOLIC BLOOD PRESSURE: 79 MMHG | RESPIRATION RATE: 16 BRPM | OXYGEN SATURATION: 97 % | HEART RATE: 71 BPM | SYSTOLIC BLOOD PRESSURE: 166 MMHG | WEIGHT: 216 LBS | BODY MASS INDEX: 28 KG/M2 | TEMPERATURE: 98 F

## 2023-05-24 DIAGNOSIS — C44.42 SQUAMOUS CELL CARCINOMA OF NECK: Primary | ICD-10-CM

## 2023-05-24 DIAGNOSIS — C77.0 SECONDARY MALIGNANCY OF CERVICOFACIAL LYMPH NODE (HCC): ICD-10-CM

## 2023-05-24 PROCEDURE — 99213 OFFICE O/P EST LOW 20 MIN: CPT | Performed by: SURGERY

## 2023-05-24 PROCEDURE — 3078F DIAST BP <80 MM HG: CPT | Performed by: SURGERY

## 2023-05-24 PROCEDURE — 1160F RVW MEDS BY RX/DR IN RCRD: CPT | Performed by: SURGERY

## 2023-05-24 PROCEDURE — 3077F SYST BP >= 140 MM HG: CPT | Performed by: SURGERY

## 2023-05-24 PROCEDURE — 1126F AMNT PAIN NOTED NONE PRSNT: CPT | Performed by: SURGERY

## 2023-05-24 PROCEDURE — 1159F MED LIST DOCD IN RCRD: CPT | Performed by: SURGERY

## 2024-01-08 ENCOUNTER — HOSPITAL ENCOUNTER (OUTPATIENT)
Dept: NUCLEAR MEDICINE | Facility: HOSPITAL | Age: 69
Discharge: HOME | End: 2024-01-08
Attending: SURGERY
Payer: MEDICARE

## 2024-01-08 DIAGNOSIS — C44.42 SQUAMOUS CELL CARCINOMA OF NECK: ICD-10-CM

## 2024-01-08 DIAGNOSIS — C77.0 SECONDARY MALIGNANCY OF CERVICOFACIAL LYMPH NODE (HCC): ICD-10-CM

## 2024-01-08 LAB — GLUCOSE BLD-MCNC: 130 MG/DL (ref 70–99)

## 2024-01-08 PROCEDURE — 78815 PET IMAGE W/CT SKULL-THIGH: CPT | Performed by: SURGERY

## 2024-01-08 PROCEDURE — 82962 GLUCOSE BLOOD TEST: CPT

## 2024-01-17 ENCOUNTER — OFFICE VISIT (OUTPATIENT)
Dept: SURGERY | Facility: CLINIC | Age: 69
End: 2024-01-17
Payer: MEDICARE

## 2024-01-17 VITALS
RESPIRATION RATE: 20 BRPM | SYSTOLIC BLOOD PRESSURE: 149 MMHG | DIASTOLIC BLOOD PRESSURE: 79 MMHG | BODY MASS INDEX: 28 KG/M2 | TEMPERATURE: 98 F | WEIGHT: 221.38 LBS

## 2024-01-17 DIAGNOSIS — C44.42 SQUAMOUS CELL CARCINOMA OF NECK: Primary | ICD-10-CM

## 2024-01-17 DIAGNOSIS — C44.229 SQUAMOUS CELL CARCINOMA OF LEFT EAR: ICD-10-CM

## 2024-01-17 DIAGNOSIS — C44.42 SQUAMOUS CELL CARCINOMA OF NECK: ICD-10-CM

## 2024-01-17 DIAGNOSIS — C77.0 SECONDARY MALIGNANCY OF CERVICOFACIAL LYMPH NODE (HCC): Primary | ICD-10-CM

## 2024-01-17 PROCEDURE — 99213 OFFICE O/P EST LOW 20 MIN: CPT | Performed by: SURGERY

## 2024-01-17 NOTE — CONSULTS
Estabilshed Patient Consultation    Chief Complaint: history of squamous cell carcinoma      History of Present Illness:   Nathan Beltran is a 68 year old male who returns to the office s/p radical resection right neck/face squamous cell carcinoma with partial resection right ear, soft tissue/muscle, selective lymph node dissection (Dr. Cooper), right superficial parotidectomy with facial nerve dissection (Dr. Suarez) and reconstruction of right infraauricular defect with cervical-deltopectoral flap, AxoGen nerve interposition graft for secondary branch of right frontal branch, local tissue rearrangement ear canal, dermal fat graft right face over nerve coaptation, SPY, use of microscope on 4/27/2021, S/p right ear reconstruction with otoplasty, local tissue rearrangement right earlobe, autologous fat grafting from abdomen to right face, dermal fat graft from right groin to right infra-auricular area, left ear biopsy 11/1/2022, and s/p excision of squamous cell cancer left ear with intraoperative frozen section and full-thickness skin graft from left preauricular area to left ear on 11/29/2022. He recently had a PET scan on 1/8/2024. He met with Dr. Cooper earlier today to review those results.     PET scan 1/8/2024:  Impression   CONCLUSION:  Interval development of a rounded focus of radiotracer centered on the skin within the soft tissues of the right posterior triangle of the neck.  Correlation with physical exam in this region is suggested.  No other areas of suspicious  increased radiotracer uptake are seen.          Past Medical History:      Past Medical History:   Diagnosis Date    Calculus of kidney     Cancer (HCC)     Diabetes (HCC)     Exposure to medical diagnostic radiation     June-August 2021    Hearing impairment     right ear    High blood pressure     High cholesterol     History of COVID-19     COVID + 10- had nasal congestion, headache, loss of tast & smell - NO hospitalization  needed    History of stomach ulcers          Past Surgical History:  Past Surgical History:   Procedure Laterality Date    CARDIAC CATHETERIZATION      COLONOSCOPY      OTHER  04/27/2021    Radical resection right neck/face squamous cell carcinoma with partial resection right ear, soft tissues/muscle, selective lymph node dissection([facility]),     UPPER GI ENDOSCOPY,EXAM           Medications:    No outpatient medications have been marked as taking for the 1/17/24 encounter (Office Visit) with Rea Steve MD.         Allergies:    No Known Allergies      Family History:   Family History   Problem Relation Age of Onset    Other (lung cancer) Mother          Social History:  History   Alcohol Use    Yes     Comment: 1 every month or so        History   Smoking Status    Former    Packs/day: 0.25    Years: 10.00    Types: Cigarettes    Quit date: 4/2/1985   Smokeless Tobacco    Never       History   Drug Use Unknown         Review of Systems:    The patient reports: see HPI  All other systems are unremarkable.      Physical Exam:    There were no vitals taken for this visit.    Constitutional: The patient is an alert, oriented and well-developed.     Neurologic: Speech patterns and movements are normal.     Psychiatric: Affect is appropriate.    Eyes: Conjunctiva are clear, non-icteric. PERRL    ENT: Left ear incisions clean, dry and intact.  Left ear skin graft with complete take.  Right ear/neck incisions well-healed.     Integument/Skin: The skin appears normal. There are no suspicious appearing rashes or lesions.    Respiratory: Normal respiratory effort.     Cardiovascular: no cyanosis, no edema    Musculoskeletal: Extremities unremarkable, without edema, tenderness or deformities    Impression:   Nathan Beltran  is a 68 year old s/p radical resection right neck/face squamous cell carcinoma with partial resection right ear, soft tissue/muscle, selective lymph node dissection (Dr. Cooper), right superficial  parotidectomy with facial nerve dissection (Dr. Suarez) and reconstruction of right infraauricular defect with cervical-deltopectoral flap, AxoGen nerve interposition graft for secondary branch of right frontal branch, local tissue rearrangement ear canal, dermal fat graft right face over nerve coaptation, SPY, use of microscope on 4/27/2021, S/p right ear reconstruction with otoplasty, local tissue rearrangement right earlobe, autologous fat grafting from abdomen to right face, dermal fat graft from right groin to right infra-auricular area, left ear biopsy 11/1/2022, and s/p excision of squamous cell cancer left ear with intraoperative frozen section and full-thickness skin graft from left preauricular area to left ear on 11/29/2022     Discussion and Plan:  The patient was counseled on the different treatment options.     He has healed well.  He is scheduled to follow-up with Dr. Cooper in 4 to 6 weeks for repeat ultrasound of his neck.  Biopsy versus excision discussion will be had at that visit if the lesion increases in size.  I am available if needed to assist.  He will otherwise follow-up with me as needed.  He can continue scar management.     25 minutes were spent with the patient, from which 20 minutes were spent counseling the patient and coordinating care.

## 2024-01-17 NOTE — PROGRESS NOTES
[facility]             Patient Name:  Nathan Beltran   YOB: 1955   Gender:  Male   Appt Date: 1/17/2024   Provider:  Quang Cooper MD     PATIENT PROVIDERS  Referring Provider: Анна Alicia MD  Primary Care Provider:Haja Turner MD   Address: [address] [place] [address]   Phone #: [phone]     CHIEF COMPLAINT  SCC of face      PROBLEMS  Reviewed   Patient Active Problem List   Diagnosis    Diabetes mellitus type 2 in nonobese (HCC)    Coronary artery disease    Benign essential HTN    Secondary malignancy of cervicofacial lymph node (HCC)    Abnormal cardiovascular stress test    BMI 30.0-30.9,adult    Hyperlipidemia    Acquired deformity of right ear    Squamous cell carcinoma of neck    Abnormal positron emission tomography (PET) scan    Squamous cell carcinoma of left ear        History of Present Illness:  Squamous cell carcinoma right neck/face    -Patient consulted with me on 3/3/2021.   -CT neck 3/14/2021: .3 x 2.2 cm infiltrating mass arising from the right side of the neck which is contiguous with the dermis and infiltrating the superficial lobe of the right parotid gland.   -PET/CT 3/22/2021: no mets  -4/27/2021: Resection, right neck/face squamous cell carcinoma with partial resection, right ear, soft tissue/muscle, selective lymph node dissection with en bloc right superficial parotidectomy with facial nerve dissection and reconstruction.    Completed radiation August 2021 11/9/2022: The patient underwent right ear reconstruction with otoplasty and local tissue rearrangement.    Interval history:  1/8/2024 PET:Interval development of a rounded focus of radiotracer centered on the skin within the soft tissues of the right posterior triangle of the neck        Vital Signs:  Blood pressure 149/79, temperature 98 °F (36.7 °C), temperature source Temporal, resp. rate 20, weight 100.4 kg (221 lb 6.4 oz).       Medications Reviewed:    Current  Outpatient Medications:     docusate sodium 100 MG Oral Cap, Take 1 capsule (100 mg total) by mouth in the morning and 1 capsule (100 mg total) before bedtime., Disp: 40 capsule, Rfl: 1    metFORMIN  MG Oral Tablet 24 Hr, Take 1 tablet (500 mg total) by mouth 2 (two) times daily., Disp: , Rfl:     rosuvastatin 10 MG Oral Tab, , Disp: , Rfl:     aspirin 81 MG Oral Chew Tab, Chew 1 tablet (81 mg total) by mouth daily., Disp: , Rfl:     Coenzyme Q10 100 MG Oral Cap, Take 100 mg by mouth daily.  , Disp: , Rfl:     tadalafil 5 MG Oral Tab, Take 1 tablet (5 mg total) by mouth daily as needed., Disp: , Rfl:      Allergies Reviewed:  No Known Allergies     History:  Reviewed:  Past Medical History:   Diagnosis Date    Calculus of kidney     Cancer (HCC)     Diabetes (HCC)     Exposure to medical diagnostic radiation     -2021    Hearing impairment     right ear    High blood pressure     High cholesterol     History of COVID-19     COVID + 10- had nasal congestion, headache, loss of tast & smell - NO hospitalization needed    History of stomach ulcers       Reviewed:  Past Surgical History:   Procedure Laterality Date    Cardiac catheterization      Colonoscopy      Other  2021    Radical resection right neck/face squamous cell carcinoma with partial resection right ear, soft tissues/muscle, selective lymph node dissection([facility]),     Upper gi endoscopy,exam        Reviewed Social History:  Social History     Socioeconomic History    Marital status:    Tobacco Use    Smoking status: Former     Packs/day: 0.25     Years: 10.00     Additional pack years: 0.00     Total pack years: 2.50     Types: Cigarettes     Quit date: 1985     Years since quittin.8    Smokeless tobacco: Never   Vaping Use    Vaping Use: Never used   Substance and Sexual Activity    Alcohol use: Yes     Comment: 1 every month or so     Drug use: Never      Reviewed:  Family History   Problem Relation Age of  Onset    Other (lung cancer) Mother         Review of Systems:  Patient reports no rapid or irregular heartbeat. He reports no chest pain. He reports no nausea. He reports no vomiting. He reports no diarrhea. He reports no constipation. He reports no bright red blood per rectum. He reports no fatigue. He reports no blood in the urine, no changes in urinary habits: initiating urination requires straining, no changes in urinary habits: delays in starting hesitancy, and no change in urine appearance. He reports no headache. He reports no dizziness. He reports no easy bruising tendency. He reports no diffuse joint pains. He reports no bone pain. He reports no back pain. He reports no swollen glands. He reports no numbness. He reports no shortness of breath. He reports no change in a mole. He reports  No recent change in weight, no fever, no chills, and no sweating heavily at night.     Physical Examination:  Constitutional: NAD.   Eyes: Sclerae: non-icteric.   Lymph Nodes: Lymph Nodes no cervical LAD, supraclavicular LAD, right axillary LAD.   No left axillary lymphadenopathy  Abdomen: Soft, no masses  Musculoskeletal: Extremities: no edema.   Skin: Surgical site healing well.    Superficial nodularity posterior right neck subcentimeter.     Document Review:  That reviewed and summarized as above.  I reviewed images.  I agree with official report.     Procedure(s):  In office ultrasound: Ultrasound was performed in the area of concern revealing a 3.27 x 2.14 mm solid superficial slightly irregular nodule without flow.       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  -Now with new abnormal PET scan right posterior neck.    *Imaging correlate by ultrasound noted.  *Options discussed.  *Patient opted for observation.  *To this regard, will return see me in 4 to 6  weeks.  At that time we will repeat ultrasound.  *Patient understands that if lesion increases in size by that time, biopsy versus excision will be discussed.  *He agreed and understood.  *He had ample time to ask questions.        Follow Up:  Late Feb 2024       Electronically Signed by:     Quang Cooper MD

## 2024-02-15 ENCOUNTER — TELEPHONE (OUTPATIENT)
Dept: SURGERY | Facility: CLINIC | Age: 69
End: 2024-02-15

## 2024-02-15 DIAGNOSIS — C77.0 SECONDARY MALIGNANCY OF CERVICOFACIAL LYMPH NODE (HCC): Primary | ICD-10-CM

## 2024-02-15 NOTE — TELEPHONE ENCOUNTER
Called patient in regard to appointment next week. Per Dr Cooper last note, patient was being seen for follow up ultrasound to see if posterior neck lesion had grown in size. Dr Cooper out of office, so recommended official US for exact measurements and in the event of growth, US is done for radiologist to perform US guided biopsy. Patient expressed understanding. Stated he has some vacations coming up and will adjust the appointment around that. Informed patient to let us know when he schedules ultrasound. He states from what he can tell, the lesion has not grown.     LEXIS Suárez

## 2024-02-29 ENCOUNTER — HOSPITAL ENCOUNTER (OUTPATIENT)
Dept: ULTRASOUND IMAGING | Age: 69
Discharge: HOME | End: 2024-02-29
Payer: MEDICARE

## 2024-02-29 DIAGNOSIS — C77.0 SECONDARY MALIGNANCY OF CERVICOFACIAL LYMPH NODE (HCC): ICD-10-CM

## 2024-02-29 PROCEDURE — 76536 US EXAM OF HEAD AND NECK: CPT

## 2024-03-01 ENCOUNTER — TELEPHONE (OUTPATIENT)
Dept: SURGERY | Facility: CLINIC | Age: 69
End: 2024-03-01

## 2024-03-01 DIAGNOSIS — C44.42 SQUAMOUS CELL CARCINOMA OF NECK: Primary | ICD-10-CM

## 2024-03-01 DIAGNOSIS — C77.0 SECONDARY MALIGNANCY OF CERVICOFACIAL LYMPH NODE (HCC): ICD-10-CM

## 2024-03-01 NOTE — TELEPHONE ENCOUNTER
Informed patient that Dr Cooper reviewed repeat ultrasound and since nodule is still present on US and slightly enlarged, he recommends biopsy of that area. Patient understood and agreed. Order placed in EPIC.    LEXIS Suárez

## 2024-03-04 ENCOUNTER — PATIENT MESSAGE (OUTPATIENT)
Dept: SURGERY | Facility: CLINIC | Age: 69
End: 2024-03-04

## 2024-03-13 ENCOUNTER — HOSPITAL ENCOUNTER (OUTPATIENT)
Dept: ULTRASOUND IMAGING | Facility: HOSPITAL | Age: 69
Discharge: HOME | End: 2024-03-13
Payer: MEDICARE

## 2024-03-13 DIAGNOSIS — C77.0 SECONDARY MALIGNANCY OF CERVICOFACIAL LYMPH NODE (HCC): ICD-10-CM

## 2024-03-13 DIAGNOSIS — C44.42 SQUAMOUS CELL CARCINOMA OF NECK: ICD-10-CM

## 2024-03-13 PROCEDURE — 38505 NEEDLE BIOPSY LYMPH NODES: CPT

## 2024-03-13 PROCEDURE — 88305 TISSUE EXAM BY PATHOLOGIST: CPT

## 2024-03-13 PROCEDURE — 76942 ECHO GUIDE FOR BIOPSY: CPT

## 2024-03-13 NOTE — PROCEDURES
[facility]   part of [facility]  Procedure Note    Nathan Beltran Patient Status:  Outpatient    1955 MRN [id number]   Location [facility] ULTRASOUND Attending Soraya Martinez PA   Hosp Day # 0 PCP Haja Turner MD     Procedure: Right posterior neck lesion biopsy    Pre-Procedure Diagnosis:  Right posterior neck lesion    Post-Procedure Diagnosis: Same    Anesthesia:  Local    Findings:  Technically successful biopsy of the right posterior neck superficial lesion. Limited soft tissue sample with the passes, fragmented. Given FDG avidity in this regiona, if pathology returns non-diagnostic, can consider excisional biopsy    Specimens: Core biopsy    Blood Loss:  <5ml    Tourniquet Time: 0  Complications:  None  Drains:  None    Secondary Diagnosis:  None    Phcu Abdullahi MD  3/13/2024

## 2024-03-18 ENCOUNTER — TELEPHONE (OUTPATIENT)
Dept: SURGERY | Facility: CLINIC | Age: 69
End: 2024-03-18

## 2024-03-18 NOTE — TELEPHONE ENCOUNTER
Reviewed pathology from IR biopsy as non-diagnostic. Patient expressed understanding. Informed that Dr Cooper reviewed pathology and recommends excision in the office since the lesion had increased uptake on PET. He agreed. Appointment scheduled. Informed patient requires ride to and from office visit.     LEXIS Suárez

## 2024-04-10 ENCOUNTER — OFFICE VISIT (OUTPATIENT)
Dept: SURGERY | Facility: CLINIC | Age: 69
End: 2024-04-10
Payer: MEDICARE

## 2024-04-10 VITALS
TEMPERATURE: 97 F | BODY MASS INDEX: 29 KG/M2 | RESPIRATION RATE: 20 BRPM | DIASTOLIC BLOOD PRESSURE: 86 MMHG | HEART RATE: 63 BPM | WEIGHT: 226.19 LBS | SYSTOLIC BLOOD PRESSURE: 154 MMHG

## 2024-04-10 DIAGNOSIS — C77.0 SECONDARY MALIGNANCY OF CERVICOFACIAL LYMPH NODE (HCC): Primary | ICD-10-CM

## 2024-04-10 DIAGNOSIS — C44.42 SQUAMOUS CELL CARCINOMA OF NECK: ICD-10-CM

## 2024-04-10 PROCEDURE — 99213 OFFICE O/P EST LOW 20 MIN: CPT | Performed by: SURGERY

## 2024-04-10 NOTE — PROGRESS NOTES
[facility]             Patient Name:  Nathan Beltran   YOB: 1955   Gender:  Male   Appt Date: 4/10/2024   Provider:  Quang Cooper MD     PATIENT PROVIDERS  Referring Provider: Анна Alicia MD  Primary Care Provider:Haja Turner MD   Address: [address] [place] [address]   Phone #: [phone]     CHIEF COMPLAINT  SCC of face      PROBLEMS  Reviewed   Patient Active Problem List   Diagnosis    Diabetes mellitus type 2 in nonobese (HCC)    Coronary artery disease    Benign essential HTN    Secondary malignancy of cervicofacial lymph node (HCC)    Abnormal cardiovascular stress test    BMI 30.0-30.9,adult    Hyperlipidemia    Acquired deformity of right ear    Squamous cell carcinoma of neck    Abnormal positron emission tomography (PET) scan    Squamous cell carcinoma of left ear        History of Present Illness:  Squamous cell carcinoma right neck/face    -Patient consulted with me on 3/3/2021.   -CT neck 3/14/2021: .3 x 2.2 cm infiltrating mass arising from the right side of the neck which is contiguous with the dermis and infiltrating the superficial lobe of the right parotid gland.   -PET/CT 3/22/2021: no mets  -4/27/2021: Resection, right neck/face squamous cell carcinoma with partial resection, right ear, soft tissue/muscle, selective lymph node dissection with en bloc right superficial parotidectomy with facial nerve dissection and reconstruction.    Completed radiation August 2021 11/9/2022: The patient underwent right ear reconstruction with otoplasty and local tissue rearrangement.    1/8/2024 PET: Interval development of a rounded focus of radiotracer centered on the skin within the soft tissues of the right posterior triangle of the neck     1/17/2024: seen in office with superficial nodularity      3/13/2024: ultrasound biopsy-->Scant portions of dense fibrous tissue, not further diagnostic.        Vital Signs:  Blood pressure 154/86,  pulse 63, temperature 97.3 °F (36.3 °C), temperature source Temporal, resp. rate 20, weight 102.6 kg (226 lb 3.2 oz).       Medications Reviewed:    Current Outpatient Medications:     docusate sodium 100 MG Oral Cap, Take 1 capsule (100 mg total) by mouth in the morning and 1 capsule (100 mg total) before bedtime., Disp: 40 capsule, Rfl: 1    rosuvastatin 10 MG Oral Tab, , Disp: , Rfl:     aspirin 81 MG Oral Chew Tab, Chew 1 tablet (81 mg total) by mouth daily., Disp: , Rfl:     Coenzyme Q10 100 MG Oral Cap, Take 100 mg by mouth daily.  , Disp: , Rfl:     tadalafil 5 MG Oral Tab, Take 1 tablet (5 mg total) by mouth daily as needed., Disp: , Rfl:     metFORMIN  MG Oral Tablet 24 Hr, Take 1 tablet (500 mg total) by mouth 2 (two) times daily., Disp: , Rfl:      Allergies Reviewed:  No Known Allergies     History:  Reviewed:  Past Medical History:    Calculus of kidney    Cancer (HCC)    Diabetes (HCC)    Exposure to medical diagnostic radiation    -2021    Hearing impairment    right ear    High blood pressure    High cholesterol    History of COVID-19    COVID + 10-30- had nasal congestion, headache, loss of tast & smell - NO hospitalization needed    History of stomach ulcers      Reviewed:  Past Surgical History:   Procedure Laterality Date    Cardiac catheterization      Colonoscopy      Other  2021    Radical resection right neck/face squamous cell carcinoma with partial resection right ear, soft tissues/muscle, selective lymph node dissection(SALTI),     Upper gi endoscopy,exam        Reviewed Social History:  Social History     Socioeconomic History    Marital status:    Tobacco Use    Smoking status: Former     Current packs/day: 0.00     Average packs/day: 0.3 packs/day for 10.0 years (2.5 ttl pk-yrs)     Types: Cigarettes     Start date: 1975     Quit date: 1985     Years since quittin.0    Smokeless tobacco: Never   Vaping Use    Vaping status: Never Used    Substance and Sexual Activity    Alcohol use: Yes     Comment: 1 every month or so     Drug use: Never     Social Determinants of Health     Food Insecurity: No Food Insecurity (3/19/2024)    Received from [facility]    Hunger Vital Sign     Worried About Running Out of Food in the Last Year: Never true     Ran Out of Food in the Last Year: Never true      Reviewed:  Family History   Problem Relation Age of Onset    Other (lung cancer) Mother         Review of Systems:  No change     Physical Examination:  Constitutional: NAD.   Eyes: Sclerae: non-icteric.   Lymph Nodes: posterior cervical LAD/nodule no longer palpable  Musculoskeletal: Extremities: no edema.      Document Review:  PATH     Procedure(s):  In office ultrasound: Ultrasound was performed in the area of concern reveals no abnormalities. The previously noted superficial nodule is no longer visible.       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  -Now with new abnormal PET scan right posterior neck.    *Patient here for excision. Since no longer palpable or visible by ultrasound, this was not performed.  PET 6/2024  RTC afterwards.  Patient understands to call or return sooner with any concern.        Follow Up:  6/2024       Electronically Signed by:     Quang Cooper MD

## 2024-06-18 ENCOUNTER — HOSPITAL ENCOUNTER (OUTPATIENT)
Dept: NUCLEAR MEDICINE | Facility: HOSPITAL | Age: 69
Discharge: HOME | End: 2024-06-18

## 2024-06-18 DIAGNOSIS — C77.0 SECONDARY MALIGNANCY OF CERVICOFACIAL LYMPH NODE (HCC): ICD-10-CM

## 2024-06-18 DIAGNOSIS — C44.42 SQUAMOUS CELL CARCINOMA OF NECK: ICD-10-CM

## 2024-06-18 LAB — GLUCOSE BLD-MCNC: 121 MG/DL (ref 70–99)

## 2024-06-18 PROCEDURE — 82962 GLUCOSE BLOOD TEST: CPT

## 2024-06-18 PROCEDURE — 78816 PET IMAGE W/CT FULL BODY: CPT

## 2024-06-21 ENCOUNTER — TELEPHONE (OUTPATIENT)
Dept: SURGERY | Facility: CLINIC | Age: 69
End: 2024-06-21

## 2024-06-21 NOTE — TELEPHONE ENCOUNTER
LVM for patient letting him know recent PET scan results are WNL. Advised him to call the office for follow up appointment.     LEXIS Suárez

## 2024-07-16 NOTE — PROGRESS NOTES
[facility]             Patient Name:  Nathan Beltran   YOB: 1955   Gender:  Male   Appt Date:  7/17/2024   Provider:  Quang Cooper MD     PATIENT PROVIDERS  Referring Provider: Анна Alicia MD  Primary Care Provider:Haja Turner MD   Address: [address] [place] [address]   Phone #: [phone]     CHIEF COMPLAINT  SCC of face      PROBLEMS  Reviewed   Patient Active Problem List   Diagnosis    Diabetes mellitus type 2 in nonobese (HCC)    Coronary artery disease    Benign essential HTN    Secondary malignancy of cervicofacial lymph node (HCC)    Abnormal cardiovascular stress test    BMI 30.0-30.9,adult    Hyperlipidemia    Acquired deformity of right ear    Squamous cell carcinoma of neck    Abnormal positron emission tomography (PET) scan    Squamous cell carcinoma of left ear        History of Present Illness:  Squamous cell carcinoma right neck/face    -Patient consulted with me on 3/3/2021.   -CT neck 3/14/2021: 3 x 2.2 cm infiltrating mass arising from the right side of the neck which is contiguous with the dermis and infiltrating the superficial lobe of the right parotid gland.   -PET/CT 3/22/2021: no mets  -4/27/2021: Resection, right neck/face squamous cell carcinoma with partial resection, right ear, soft tissue/muscle, selective lymph node dissection with en bloc right superficial parotidectomy with facial nerve dissection and reconstruction.    Completed radiation August 2021 11/9/2022: The patient underwent right ear reconstruction with otoplasty and local tissue rearrangement.    1/8/2024 PET: Interval development of a rounded focus of radiotracer centered on the skin within the soft tissues of the right posterior triangle of the neck     1/17/2024: seen in office with superficial nodularity      3/13/2024: ultrasound biopsy-->Scant portions of dense fibrous tissue, not further diagnostic.   4/2024: Presented for excision back node no  longer palpable.    Interval history:  6/18/2024 PET without evidence for recurrence or metastases.       Vital Signs:  Blood pressure (!) 155/96, pulse 70, temperature 97.7 °F (36.5 °C), temperature source Temporal, resp. rate 20, weight 103 kg (227 lb).       Medications Reviewed:    Current Outpatient Medications:     docusate sodium 100 MG Oral Cap, Take 1 capsule (100 mg total) by mouth in the morning and 1 capsule (100 mg total) before bedtime., Disp: 40 capsule, Rfl: 1    metFORMIN  MG Oral Tablet 24 Hr, Take 1 tablet (500 mg total) by mouth 2 (two) times daily., Disp: , Rfl:     rosuvastatin 10 MG Oral Tab, , Disp: , Rfl:     aspirin 81 MG Oral Chew Tab, Chew 1 tablet (81 mg total) by mouth daily., Disp: , Rfl:     Coenzyme Q10 100 MG Oral Cap, Take 100 mg by mouth daily.  , Disp: , Rfl:     tadalafil 5 MG Oral Tab, Take 1 tablet (5 mg total) by mouth daily as needed., Disp: , Rfl:      Allergies Reviewed:  No Known Allergies     History:  Reviewed:  Past Medical History:    Calculus of kidney    Cancer (HCC)    Diabetes (HCC)    Exposure to medical diagnostic radiation    June-August 2021    Hearing impairment    right ear    High blood pressure    High cholesterol    History of COVID-19    COVID + 10- had nasal congestion, headache, loss of tast & smell - NO hospitalization needed    History of stomach ulcers      Reviewed:  Past Surgical History:   Procedure Laterality Date    Cardiac catheterization      Colonoscopy      Other  04/27/2021    Radical resection right neck/face squamous cell carcinoma with partial resection right ear, soft tissues/muscle, selective lymph node dissection(SALTI),     Upper gi endoscopy,exam        Reviewed Social History:  Social History     Socioeconomic History    Marital status:    Tobacco Use    Smoking status: Former     Current packs/day: 0.00     Average packs/day: 0.3 packs/day for 10.0 years (2.5 ttl pk-yrs)     Types: Cigarettes     Start date:  1975     Quit date: 1985     Years since quittin.3    Smokeless tobacco: Never   Vaping Use    Vaping status: Never Used   Substance and Sexual Activity    Alcohol use: Yes     Comment: 1 every month or so     Drug use: Never     Social Determinants of Health     Food Insecurity: No Food Insecurity (3/19/2024)    Received from [facility]    Hunger Vital Sign     Worried About Running Out of Food in the Last Year: Never true     Ran Out of Food in the Last Year: Never true      Reviewed:  Family History   Problem Relation Age of Onset    Other (lung cancer) Mother         Review of Systems:  No change     Physical Examination:  Constitutional: NAD.   Eyes: Sclerae: non-icteric.   Lymph Nodes: posterior cervical LAD/nodule no longer palpable  Musculoskeletal: Extremities: no edema.      Document Review:  PET scan 2024: No evidence for metastatic disease.     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  -Doing well and remains CY for > 3 yrs.  -Continue follow-up with Dr. Alicia from dermatology.  -Return to clinic 6 months   -Anticipate that 2024        Follow Up:  2024       Electronically Signed by:     Quang Cooper MD

## 2024-07-17 ENCOUNTER — OFFICE VISIT (OUTPATIENT)
Dept: SURGERY | Facility: CLINIC | Age: 69
End: 2024-07-17
Payer: MEDICARE

## 2024-07-17 VITALS
DIASTOLIC BLOOD PRESSURE: 96 MMHG | SYSTOLIC BLOOD PRESSURE: 155 MMHG | TEMPERATURE: 98 F | RESPIRATION RATE: 20 BRPM | HEART RATE: 70 BPM | WEIGHT: 227 LBS | BODY MASS INDEX: 29 KG/M2

## 2024-07-17 DIAGNOSIS — C77.0 SECONDARY MALIGNANCY OF CERVICOFACIAL LYMPH NODE (HCC): ICD-10-CM

## 2024-07-17 DIAGNOSIS — C44.42 SQUAMOUS CELL CARCINOMA OF NECK: ICD-10-CM

## 2024-07-17 DIAGNOSIS — C44.42 SQUAMOUS CELL CARCINOMA OF NECK: Primary | ICD-10-CM

## 2024-07-17 DIAGNOSIS — C44.229 SQUAMOUS CELL CARCINOMA OF LEFT EAR: Primary | ICD-10-CM

## 2024-07-17 PROCEDURE — 99213 OFFICE O/P EST LOW 20 MIN: CPT | Performed by: SURGERY

## 2025-01-15 ENCOUNTER — HOSPITAL ENCOUNTER (OUTPATIENT)
Dept: NUCLEAR MEDICINE | Facility: HOSPITAL | Age: 70
Discharge: HOME | End: 2025-01-15
Payer: MEDICARE

## 2025-01-15 DIAGNOSIS — C44.42 SQUAMOUS CELL CARCINOMA OF NECK: ICD-10-CM

## 2025-01-15 DIAGNOSIS — C77.0 SECONDARY MALIGNANCY OF CERVICOFACIAL LYMPH NODE (HCC): ICD-10-CM

## 2025-01-15 LAB — GLUCOSE BLD-MCNC: 100 MG/DL (ref 70–99)

## 2025-01-15 PROCEDURE — 82962 GLUCOSE BLOOD TEST: CPT

## 2025-01-15 PROCEDURE — 78816 PET IMAGE W/CT FULL BODY: CPT

## 2025-01-21 NOTE — PROGRESS NOTES
[facility]             Patient Name:  Nathan Beltran   YOB: 1955   Gender:  Male   Appt Date:  1/22/2025   Provider:  Quang Cooper MD     PATIENT PROVIDERS  Referring Provider: Анна Alicia MD  Primary Care Provider:No primary care provider on file.   Address: [address] [place] [address]   Phone #: [phone]     CHIEF COMPLAINT  SCC of face      PROBLEMS  Reviewed   Patient Active Problem List   Diagnosis    Diabetes mellitus type 2 in nonobese (HCC)    Coronary artery disease    Benign essential HTN    Secondary malignancy of cervicofacial lymph node (HCC)    Abnormal cardiovascular stress test    BMI 30.0-30.9,adult    Hyperlipidemia    Acquired deformity of right ear    Squamous cell carcinoma of neck    Abnormal positron emission tomography (PET) scan    Squamous cell carcinoma of left ear        History of Present Illness:  Squamous cell carcinoma right neck/face    -Patient consulted with me on 3/3/2021.   -CT neck 3/14/2021: 3 x 2.2 cm infiltrating mass arising from the right side of the neck which is contiguous with the dermis and infiltrating the superficial lobe of the right parotid gland.   -PET/CT 3/22/2021: no mets  -4/27/2021: Resection, right neck/face squamous cell carcinoma with partial resection, right ear, soft tissue/muscle, selective lymph node dissection with en bloc right superficial parotidectomy with facial nerve dissection and reconstruction.    Completed radiation August 2021 11/9/2022: The patient underwent right ear reconstruction with otoplasty and local tissue rearrangement.    1/8/2024 PET: Interval development of a rounded focus of radiotracer centered on the skin within the soft tissues of the right posterior triangle of the neck     1/17/2024: seen in office with superficial nodularity    3/13/2024: ultrasound biopsy-->Scant portions of dense fibrous tissue, not further diagnostic.   4/2024: Presented for excision  back node no longer palpable.    Interval History:  1/15/2025: PET CY    Patient is here for a follow-up visit.  There are no new medical or surgical issues.     Vital Signs:  Blood pressure 156/88, pulse 78, temperature 98.7 °F (37.1 °C), temperature source Tympanic, resp. rate 16, weight 100.7 kg (222 lb), SpO2 96%.       Medications Reviewed:    Current Outpatient Medications:     docusate sodium 100 MG Oral Cap, Take 1 capsule (100 mg total) by mouth in the morning and 1 capsule (100 mg total) before bedtime., Disp: 40 capsule, Rfl: 1    metFORMIN  MG Oral Tablet 24 Hr, Take 1 tablet (500 mg total) by mouth 2 (two) times daily., Disp: , Rfl:     rosuvastatin 10 MG Oral Tab, , Disp: , Rfl:     aspirin 81 MG Oral Chew Tab, Chew 1 tablet (81 mg total) by mouth daily., Disp: , Rfl:     Coenzyme Q10 100 MG Oral Cap, Take 100 mg by mouth daily.  , Disp: , Rfl:     tadalafil 5 MG Oral Tab, Take 1 tablet (5 mg total) by mouth daily as needed., Disp: , Rfl:      Allergies Reviewed:  No Known Allergies     History:  Reviewed:  Past Medical History:    Calculus of kidney    Cancer (HCC)    Diabetes (HCC)    Exposure to medical diagnostic radiation    June-August 2021    Hearing impairment    right ear    High blood pressure    High cholesterol    History of COVID-19    COVID + 10- had nasal congestion, headache, loss of tast & smell - NO hospitalization needed    History of stomach ulcers      Reviewed:  Past Surgical History:   Procedure Laterality Date    Cardiac catheterization      Colonoscopy      Other  04/27/2021    Radical resection right neck/face squamous cell carcinoma with partial resection right ear, soft tissues/muscle, selective lymph node dissection(SALTI),     Upper gi endoscopy,exam        Reviewed Social History:  Social History     Socioeconomic History    Marital status:    Tobacco Use    Smoking status: Former     Current packs/day: 0.00     Average packs/day: 0.3 packs/day for  10.0 years (2.5 ttl pk-yrs)     Types: Cigarettes     Start date: 1975     Quit date: 1985     Years since quittin.8    Smokeless tobacco: Never   Vaping Use    Vaping status: Never Used   Substance and Sexual Activity    Alcohol use: Yes     Comment: 1 every month or so     Drug use: Never     Social Drivers of Health     Food Insecurity: No Food Insecurity (3/19/2024)    Received from [facility]    Hunger Vital Sign     Worried About Running Out of Food in the Last Year: Never true     Ran Out of Food in the Last Year: Never true      Reviewed:  Family History   Problem Relation Age of Onset    Other (lung cancer) Mother         Review of Systems:  No change     Physical Examination:  Constitutional: NAD.   Eyes: Sclerae: non-icteric.   Lymph Nodes: posterior cervical LAD/nodule no longer palpable  Musculoskeletal: Extremities: no edema.      Document Review:  1/15/2025 PET:  CONCLUSION:  No developing malignant or metastatic uptake.      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  -Doing well and remains CY for ~ 45 months  -Continue follow-up with dermatology.  -Return to clinic 6 months           Follow Up:  6 mo       Electronically Signed by:     Quang Cooper MD

## 2025-01-22 ENCOUNTER — OFFICE VISIT (OUTPATIENT)
Dept: SURGERY | Facility: CLINIC | Age: 70
End: 2025-01-22
Payer: MEDICARE

## 2025-01-22 VITALS
BODY MASS INDEX: 29 KG/M2 | WEIGHT: 222 LBS | RESPIRATION RATE: 16 BRPM | OXYGEN SATURATION: 96 % | DIASTOLIC BLOOD PRESSURE: 88 MMHG | TEMPERATURE: 99 F | SYSTOLIC BLOOD PRESSURE: 156 MMHG | HEART RATE: 78 BPM

## 2025-01-22 DIAGNOSIS — C77.0 SECONDARY MALIGNANCY OF CERVICOFACIAL LYMPH NODE (HCC): Primary | ICD-10-CM

## 2025-01-22 DIAGNOSIS — C44.42 SQUAMOUS CELL CARCINOMA OF NECK: ICD-10-CM

## 2025-01-22 PROCEDURE — 99213 OFFICE O/P EST LOW 20 MIN: CPT | Performed by: SURGERY

## 2025-01-22 PROCEDURE — 1159F MED LIST DOCD IN RCRD: CPT | Performed by: SURGERY

## 2025-01-22 PROCEDURE — 1160F RVW MEDS BY RX/DR IN RCRD: CPT | Performed by: SURGERY

## 2025-06-30 DIAGNOSIS — C44.229 SQUAMOUS CELL CARCINOMA OF LEFT EAR: ICD-10-CM

## 2025-06-30 DIAGNOSIS — C77.0 SECONDARY MALIGNANCY OF CERVICOFACIAL LYMPH NODE (HCC): Primary | ICD-10-CM

## 2025-06-30 DIAGNOSIS — C44.42 SQUAMOUS CELL CARCINOMA OF NECK: ICD-10-CM

## 2025-07-10 ENCOUNTER — HOSPITAL ENCOUNTER (OUTPATIENT)
Dept: NUCLEAR MEDICINE | Facility: HOSPITAL | Age: 70
Discharge: HOME | End: 2025-07-10
Payer: MEDICARE

## 2025-07-10 DIAGNOSIS — C44.229 SQUAMOUS CELL CARCINOMA OF LEFT EAR: ICD-10-CM

## 2025-07-10 DIAGNOSIS — C77.0 SECONDARY MALIGNANCY OF CERVICOFACIAL LYMPH NODE (HCC): ICD-10-CM

## 2025-07-10 DIAGNOSIS — C44.42 SQUAMOUS CELL CARCINOMA OF NECK: ICD-10-CM

## 2025-07-10 LAB — GLUCOSE BLD-MCNC: 126 MG/DL (ref 70–99)

## 2025-07-10 PROCEDURE — 78816 PET IMAGE W/CT FULL BODY: CPT

## 2025-07-10 PROCEDURE — 82962 GLUCOSE BLOOD TEST: CPT

## 2025-07-14 NOTE — PROGRESS NOTES
[facility]             Patient Name:  Nathan Beltran   YOB: 1955   Gender:  Male   Appt Date:  7/23/25   Provider:  Quang Cooper MD     PATIENT PROVIDERS  Referring Provider: [facility]     CHIEF COMPLAINT  SCC of face      PROBLEMS  Reviewed   Patient Active Problem List   Diagnosis    Diabetes mellitus type 2 in nonobese (HCC)    Coronary artery disease    Benign essential HTN    Secondary malignancy of cervicofacial lymph node (HCC)    Abnormal cardiovascular stress test    BMI 30.0-30.9,adult    Hyperlipidemia    Acquired deformity of right ear    Squamous cell carcinoma of neck    Abnormal positron emission tomography (PET) scan    Squamous cell carcinoma of left ear        History of Present Illness:  Squamous cell carcinoma right neck/face    -Patient consulted with me on 3/3/2021.   -CT neck 3/14/2021: 3 x 2.2 cm infiltrating mass arising from the right side of the neck which is contiguous with the dermis and infiltrating the superficial lobe of the right parotid gland.   -PET/CT 3/22/2021: no mets  -4/27/2021: Resection, right neck/face squamous cell carcinoma with partial resection, right ear, soft tissue/muscle, selective lymph node dissection with en bloc right superficial parotidectomy with facial nerve dissection and reconstruction.    Completed radiation August 2021 11/9/2022: The patient underwent right ear reconstruction with otoplasty and local tissue rearrangement.    1/8/2024 PET: Interval development of a rounded focus of radiotracer centered on the skin within the soft tissues of the right posterior triangle of the neck     1/17/2024: seen in office with superficial nodularity    3/13/2024: ultrasound biopsy-->Scant portions of dense fibrous tissue, not further diagnostic.   4/2024: Presented for excision back node no longer palpable.    1/15/2025: PET CY    Interval History:  7/10/25: PET CY  Patient is here for a follow-up  visit.  There are no new medical or surgical issues.   Sees dermatology PA regularly.      Vital Signs:  Blood pressure (!) 173/85, pulse 71, temperature 97.7 °F (36.5 °C), temperature source Tympanic, resp. rate 16, weight 106.1 kg (234 lb), SpO2 98%.       Medications Reviewed:    Current Outpatient Medications:     docusate sodium 100 MG Oral Cap, Take 1 capsule (100 mg total) by mouth in the morning and 1 capsule (100 mg total) before bedtime., Disp: 40 capsule, Rfl: 1    rosuvastatin 10 MG Oral Tab, , Disp: , Rfl:     aspirin 81 MG Oral Chew Tab, Chew 1 tablet (81 mg total) by mouth in the morning., Disp: , Rfl:     Coenzyme Q10 100 MG Oral Cap, Take 100 mg by mouth in the morning., Disp: , Rfl:     tadalafil 5 MG Oral Tab, Take 1 tablet (5 mg total) by mouth daily as needed., Disp: , Rfl:      Allergies Reviewed:  No Known Allergies     History:  Reviewed:  Past Medical History:    Calculus of kidney    Cancer (HCC)    Diabetes (HCC)    Exposure to medical diagnostic radiation    -2021    Hearing impairment    right ear    High blood pressure    High cholesterol    History of COVID-19    COVID + 10-30- had nasal congestion, headache, loss of tast & smell - NO hospitalization needed    History of stomach ulcers      Reviewed:  Past Surgical History:   Procedure Laterality Date    Cardiac catheterization      Colonoscopy      Other  2021    Radical resection right neck/face squamous cell carcinoma with partial resection right ear, soft tissues/muscle, selective lymph node dissection(SALTI),     Upper gi endoscopy,exam        Reviewed Social History:  Social History     Socioeconomic History    Marital status:    Tobacco Use    Smoking status: Former     Current packs/day: 0.00     Average packs/day: 0.3 packs/day for 10.0 years (2.5 ttl pk-yrs)     Types: Cigarettes     Start date: 1975     Quit date: 1985     Years since quittin.3    Smokeless tobacco: Never   Vaping Use     Vaping status: Never Used   Substance and Sexual Activity    Alcohol use: Yes     Comment: 1 every month or so     Drug use: Never     Social Drivers of Health     Food Insecurity: No Food Insecurity (3/19/2024)    [facility]    Hunger Vital Sign     Worried About Running Out of Food in the Last Year: Never true     Ran Out of Food in the Last Year: Never true      Reviewed:  Family History   Problem Relation Age of Onset    Other (lung cancer) Mother         Review of Systems:  No change     Physical Examination:  Constitutional: NAD.   Eyes: Sclerae: non-icteric.   Lymph Nodes: posterior cervical LAD/nodule no longer palpable  Musculoskeletal: Extremities: no edema.   Skin: surgical site well healed, no evidence of local or regional recurrence      Document Review:  7/10/2025 PET:  No evidence of recurrent or metastatic neoplasm. No significant change.      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  -Doing well and remains CY for ~ 51 months  -Continue follow-up with dermatology.  -Return to clinic 6 months with repeat PET   - Patient agreed and understood.      Follow Up:  6 mo     Electronically Signed by:     Quang Cooper MD

## 2025-07-23 ENCOUNTER — OFFICE VISIT (OUTPATIENT)
Dept: SURGERY | Facility: CLINIC | Age: 70
End: 2025-07-23
Payer: MEDICARE

## 2025-07-23 VITALS
TEMPERATURE: 98 F | OXYGEN SATURATION: 98 % | BODY MASS INDEX: 30 KG/M2 | WEIGHT: 234 LBS | HEART RATE: 71 BPM | RESPIRATION RATE: 16 BRPM | SYSTOLIC BLOOD PRESSURE: 173 MMHG | DIASTOLIC BLOOD PRESSURE: 85 MMHG

## 2025-07-23 DIAGNOSIS — C44.229 SQUAMOUS CELL CARCINOMA OF LEFT EAR: ICD-10-CM

## 2025-07-23 DIAGNOSIS — C77.0 SECONDARY MALIGNANCY OF CERVICOFACIAL LYMPH NODE (HCC): ICD-10-CM

## 2025-07-23 DIAGNOSIS — C44.42 SQUAMOUS CELL CARCINOMA OF NECK: Primary | ICD-10-CM

## 2025-08-13 ENCOUNTER — OFFICE VISIT (OUTPATIENT)
Dept: NEPHROLOGY | Facility: CLINIC | Age: 70
End: 2025-08-13

## 2025-08-13 VITALS — BODY MASS INDEX: 30 KG/M2 | WEIGHT: 230.19 LBS | SYSTOLIC BLOOD PRESSURE: 146 MMHG | DIASTOLIC BLOOD PRESSURE: 86 MMHG

## 2025-08-13 DIAGNOSIS — N18.32 CKD STAGE 3B, GFR 30-44 ML/MIN (HCC): Primary | ICD-10-CM

## 2025-08-13 PROCEDURE — 1160F RVW MEDS BY RX/DR IN RCRD: CPT | Performed by: INTERNAL MEDICINE

## 2025-08-13 PROCEDURE — 99204 OFFICE O/P NEW MOD 45 MIN: CPT | Performed by: INTERNAL MEDICINE

## 2025-08-13 PROCEDURE — 1159F MED LIST DOCD IN RCRD: CPT | Performed by: INTERNAL MEDICINE

## 2025-08-13 RX ORDER — DAPAGLIFLOZIN 10 MG/1
10 TABLET, FILM COATED ORAL DAILY
COMMUNITY
Start: 2023-09-18

## 2025-08-13 RX ORDER — METOPROLOL SUCCINATE 25 MG/1
25 TABLET, EXTENDED RELEASE ORAL DAILY
COMMUNITY
Start: 2025-03-27

## (undated) DEVICE — STANDARD HYPODERMIC NEEDLE,POLYPROPYLENE HUB: Brand: MONOJECT

## (undated) DEVICE — PREP BETADINE SOL 5% EYE

## (undated) DEVICE — SUTURE ETHILON 3-0 PS-2

## (undated) DEVICE — HEAD AND NECK CDS-LF: Brand: MEDLINE INDUSTRIES, INC.

## (undated) DEVICE — LAPAROTOMY SPONGE - RF AND X-RAY DETECTABLE PRE-WASHED: Brand: SITUATE

## (undated) DEVICE — SUTURE VICRYL 3-0 SH

## (undated) DEVICE — SYRINGE 10ML LL CONTRL SYRINGE

## (undated) DEVICE — SLEEVE KENDALL SCD EXPRESS MED

## (undated) DEVICE — MEGADYNE ELECTRODE ADULT PT RT

## (undated) DEVICE — INTENDED TO BE USED TO OCCLUDE, RETRACT AND IDENTIFY ARTERIES, VEINS, TENDONS AND NERVES IN SURGICAL PROCEDURES: Brand: STERION®  VESSEL LOOP

## (undated) DEVICE — SOLUTION  .9 1000ML BTL

## (undated) DEVICE — TOWEL SURG OR 17X30IN BLUE

## (undated) DEVICE — #10 STERILE DISPOSABLE SCALPEL: Brand: DISPOSABLE SCALPEL

## (undated) DEVICE — STERILE SYNTHETIC POLYISOPRENE POWDER-FREE SURGICAL GLOVES WITH HYDROGEL COATING, SMOOTH FINISH, STRAIGHT FINGER: Brand: PROTEXIS

## (undated) DEVICE — SUT MONOCRYL 5-0 P-3 Y463G

## (undated) DEVICE — PROVE COVER: Brand: UNBRANDED

## (undated) DEVICE — SUT SILK 4-0 RB-1 K871H

## (undated) DEVICE — #10 STERILE BLADE: Brand: POLYMER COATED BLADES

## (undated) DEVICE — MEGADYNE E-Z CLEAN BLADE 2.75"

## (undated) DEVICE — STERILE POLYISOPRENE POWDER-FREE SURGICAL GLOVES: Brand: PROTEXIS

## (undated) DEVICE — RETRACTOR LONE STAR STAYS BLNT

## (undated) DEVICE — PROXIMATE SKIN STAPLERS (35 WIDE) CONTAINS 35 STAINLESS STEEL STAPLES (FIXED HEAD): Brand: PROXIMATE

## (undated) DEVICE — 3M™ STERI-STRIP™ REINFORCED ADHESIVE SKIN CLOSURES, R1547, 1/2 IN X 4 IN (12 MM X 100 MM), 6 STRIPS/ENVELOPE: Brand: 3M™ STERI-STRIP™

## (undated) DEVICE — SUT VICRYL 4-0 RB-1 J304H

## (undated) DEVICE — PEN SKIN MARKING REG TIP VIOLT

## (undated) DEVICE — DRESSING BIOPATCH 1X4 CNTR

## (undated) DEVICE — SKIN MARKER DUAL TIP WITH RULER CAP AND LABELS: Brand: DEVON

## (undated) DEVICE — DRAIN ROUND HUBLESS 15FR

## (undated) DEVICE — MAGNETIC INSTRUMENT PAD 10" X 16"; MEDIUM; DISPOSABLE: Brand: CARDINAL HEALTH

## (undated) DEVICE — SUT PROLENE 6-0 RB-2 8714H

## (undated) DEVICE — #15 STERILE STAINLESS BLADE: Brand: STERILE STAINLESS BLADES

## (undated) DEVICE — 3M™ IOBAN™ 2 ANTIMICROBIAL INCISE DRAPE 6648EZ: Brand: IOBAN™ 2

## (undated) DEVICE — #12 STERILE BLADE: Brand: POLYMER COATED BLADES

## (undated) DEVICE — SUT PROLENE 5-0 RB-2 8710H

## (undated) DEVICE — VIAL LABORATORY SPY

## (undated) DEVICE — SUT CHROMIC GUT 5-0 RB-1 U202H

## (undated) DEVICE — PREMIUM WET SKIN PREP TRAY: Brand: MEDLINE INDUSTRIES, INC.

## (undated) DEVICE — DERMA+FLEX TISSUE ADHESIVE

## (undated) DEVICE — 1 ML TUBERCULIN SYRINGE REGULAR TIP: Brand: MONOJECT

## (undated) DEVICE — OCCLUSIVE GAUZE STRIP OVERWRAP,3% BISMUTH TRIBROMOPHENATE IN PETROLATUM BLEND: Brand: XEROFORM

## (undated) DEVICE — BANDAGE,GAUZE,BULKEE II,4.5"X4.1YD,STRL: Brand: MEDLINE

## (undated) DEVICE — SUT VICRYL 5-0 RB-1 J213H

## (undated) DEVICE — ELECTRODE ESURG 2.75IN EZ CLN

## (undated) DEVICE — NON-ADHERENT PAD PREPACK: Brand: TELFA

## (undated) DEVICE — DRESSING FOAM TOPIFOAM

## (undated) DEVICE — DRAIN RESERVOIR RELIAVAC 100CC

## (undated) DEVICE — SYRINGE 10ML LL TIP

## (undated) DEVICE — SOL  .9 1000ML BTL

## (undated) DEVICE — MARKER SKIN PREP RESIST STRL

## (undated) DEVICE — SPONGE: SPECIALTY PEANUT XR 100/CS: Brand: MEDICAL ACTION INDUSTRIES

## (undated) DEVICE — SCD SLEEVE KNEE HI BLEND

## (undated) DEVICE — SYRINGE 3ML LL TIP

## (undated) DEVICE — 3M(TM) TEGADERM(TM) TRANSPARENT FILM DRESSING FRAME STYLE 9505W: Brand: 3M™ TEGADERM™

## (undated) DEVICE — SUTURE VICRYL 4-0 PC-1

## (undated) DEVICE — MICRO CLIP GOLD

## (undated) DEVICE — ASPIRATION TUBING SET, DISPOSABLE: Brand: MICROAIRE®

## (undated) DEVICE — 1010 S-DRAPE TOWEL DRAPE 10/BX: Brand: STERI-DRAPE™

## (undated) DEVICE — LIGHT HANDLE

## (undated) DEVICE — BLADE 24 SS SRG STRL

## (undated) DEVICE — HARMONIC FOCUS SHEARS 9CM LENGTH + ADAPTIVE TISSUE TECHNOLOGY FOR USE WITH BLUE HAND PIECE ONLY: Brand: HARMONIC FOCUS

## (undated) DEVICE — GOWN,SIRUS,FABRIC-REINFORCED,X-LARGE: Brand: MEDLINE

## (undated) DEVICE — Device

## (undated) DEVICE — CG INFILTRATION TUBING: Brand: CG INFILTRATION TUBING

## (undated) DEVICE — SYRINGE BULB 50/CS 48/PLT: Brand: MEDEGEN MEDICAL PRODUCTS, LLC

## (undated) DEVICE — MICRO KOVER: Brand: UNBRANDED

## (undated) DEVICE — SUTURE PROLENE 3-0 SH

## (undated) DEVICE — BINDER ABDOMINAL 46-62IN X 9IN

## (undated) DEVICE — THE LIPOGRAFTER KIT IS A STERILE, SINGLE USE DISPOSABLE DEVICE THAT IS USED IN HARVESTING, SEDIMENTING, AND TRANSFERRING AUTOLOGOUS FAT TO THE DESIRED ANATOMY.: Brand: LIPOGRAFTER

## (undated) DEVICE — STIMULATOR NERVE CHECKPOINT

## (undated) DEVICE — SUT ETHILON 3-0 PS-2 1669H

## (undated) DEVICE — CAUTERY NEEDLE 2IN INS E1465

## (undated) DEVICE — 3M™ TEGADERM™ TRANSPARENT FILM DRESSING, 1626W, 4 IN X 4-3/4 IN (10 CM X 12 CM), 50 EACH/CARTON, 4 CARTON/CASE: Brand: 3M™ TEGADERM™

## (undated) DEVICE — SUT PDS II 4-0 RB-1 Z304H

## (undated) DEVICE — REM POLYHESIVE ADULT PATIENT RETURN ELECTRODE: Brand: VALLEYLAB

## (undated) DEVICE — UNDYED BRAIDED (POLYGLACTIN 910), SYNTHETIC ABSORBABLE SUTURE: Brand: COATED VICRYL

## (undated) DEVICE — SUTURE ETHILON 9-0 BV130-3

## (undated) DEVICE — GEL AQUASONIC 100 20GR

## (undated) DEVICE — CASED DISP BIPOLAR CORD

## (undated) DEVICE — DRAPE HALF 40X58 DYNJP2410

## (undated) NOTE — LETTER
[facility] SURGICAL ONCOLOGY GROUP  [facility], [address] [place] [id number]  [facility]: [phone]  FAX: [phone]    Medical Clearance Request    Keke Laird MD  [address] [facility] [address]  Via Fax: [phone]    The patient

## (undated) NOTE — LETTER
[facility] SURGICAL ONCOLOGY GROUP  [facility], [address] [facility] [address] [address]  [facility]: [phone]  FAX: [phone]    Medical Clearance Request    Iman Salvador DO  [id number] [address] [address] [address]  Via Fax: [phone]    The patient

## (undated) NOTE — LETTER
[facility]  Phone: [phone]  OUTSIDE TESTING RESULT REQUEST    TO:   Tray Lee       Today's Date: 4/2/21    FAX #: [phone]    Patient stated was having his labs done with your office.    Please do testing as listed below

## (undated) NOTE — LETTER
OUTSIDE TESTING RESULT REQUEST     IMPORTANT: FOR YOUR IMMEDIATE ATTENTION    Please FAX all test results listed below to: [phone]     * * * * If testing is NOT complete, arrange with patient A.S.A.P. * * * *      Patient Name: Everet Pump  Surgery Date: 2022  CSN: [id number]  Medical Record: [id number]   : 1955 - A: 79 y      Sex: male  Surgeon(s):  Renita Molina MD  Procedure: Right ear reconstruction, otoplasty, possible autologous fat grafting  Anesthesia Type: General     Surgeon: Renita Molina MD     The following Testing and Time Line are REQUIRED PER ANESTHESIA     EKG READ AND SIGNED WITHIN   90 days      Thank You,   Sent by:THOM Sheffield - Pre-Admission Testing

## (undated) NOTE — LETTER
Date:  10/28/2022  Patient Name: Pam Abel / Sex: 9/6/1955-A: 79 y  male  CSN: [id number]  Medical Records: [id number]    Clearance for Surgery Requested by Surgeon    Request for:  Medical Clearance    Requested by Surgeon: Dr. Davi Ahumada    Surgical Date: 11/1/2022    Procedure: Right ear reconstruction, otoplasty, possible autologous fat grafting, Right  FACIAL FAT GRAFTING, N/A    Mr. Elizabeth Sanon states he was cleared by Dr. Kimberley Ruth and clearance was faxed to surgeon. Please fax the clearance note to the [place] [phone]. Thank you.   Edward Correa RN - Pre-Admission Testing